# Patient Record
Sex: FEMALE | Race: WHITE | NOT HISPANIC OR LATINO | Employment: OTHER | URBAN - METROPOLITAN AREA
[De-identification: names, ages, dates, MRNs, and addresses within clinical notes are randomized per-mention and may not be internally consistent; named-entity substitution may affect disease eponyms.]

---

## 2023-06-22 DIAGNOSIS — I10 PRIMARY HYPERTENSION: Primary | ICD-10-CM

## 2023-06-22 RX ORDER — LISINOPRIL 10 MG/1
TABLET ORAL
Qty: 90 TABLET | Refills: 0 | Status: SHIPPED | OUTPATIENT
Start: 2023-06-22 | End: 2023-07-05 | Stop reason: SDUPTHER

## 2023-07-05 ENCOUNTER — OFFICE VISIT (OUTPATIENT)
Dept: PRIMARY CARE | Facility: CLINIC | Age: 78
End: 2023-07-05
Payer: MEDICARE

## 2023-07-05 ENCOUNTER — LAB (OUTPATIENT)
Dept: LAB | Facility: LAB | Age: 78
End: 2023-07-05
Payer: MEDICARE

## 2023-07-05 VITALS
HEIGHT: 61 IN | HEART RATE: 85 BPM | SYSTOLIC BLOOD PRESSURE: 124 MMHG | BODY MASS INDEX: 25.68 KG/M2 | DIASTOLIC BLOOD PRESSURE: 78 MMHG | TEMPERATURE: 97.8 F | OXYGEN SATURATION: 97 % | WEIGHT: 136 LBS

## 2023-07-05 DIAGNOSIS — R42 VERTIGO: ICD-10-CM

## 2023-07-05 DIAGNOSIS — I10 PRIMARY HYPERTENSION: ICD-10-CM

## 2023-07-05 DIAGNOSIS — E55.9 VITAMIN D DEFICIENCY: Primary | ICD-10-CM

## 2023-07-05 DIAGNOSIS — E55.9 VITAMIN D DEFICIENCY: ICD-10-CM

## 2023-07-05 DIAGNOSIS — Z00.00 ROUTINE ADULT HEALTH MAINTENANCE: ICD-10-CM

## 2023-07-05 DIAGNOSIS — Z78.0 ASYMPTOMATIC MENOPAUSE: ICD-10-CM

## 2023-07-05 DIAGNOSIS — K21.00 GASTROESOPHAGEAL REFLUX DISEASE WITH ESOPHAGITIS WITHOUT HEMORRHAGE: ICD-10-CM

## 2023-07-05 DIAGNOSIS — Z00.00 MEDICARE ANNUAL WELLNESS VISIT, SUBSEQUENT: ICD-10-CM

## 2023-07-05 PROBLEM — R73.03 PREDIABETES: Status: ACTIVE | Noted: 2020-02-19

## 2023-07-05 PROBLEM — M79.7 FIBROMYALGIA: Status: ACTIVE | Noted: 2019-08-30

## 2023-07-05 PROBLEM — N18.9 CHRONIC KIDNEY DISEASE: Status: ACTIVE | Noted: 2020-02-19

## 2023-07-05 PROBLEM — R73.01 IMPAIRED FASTING GLUCOSE: Status: ACTIVE | Noted: 2019-08-30

## 2023-07-05 LAB
ALANINE AMINOTRANSFERASE (SGPT) (U/L) IN SER/PLAS: 7 U/L (ref 7–45)
ALBUMIN (G/DL) IN SER/PLAS: 4.4 G/DL (ref 3.4–5)
ALKALINE PHOSPHATASE (U/L) IN SER/PLAS: 44 U/L (ref 33–136)
ANION GAP IN SER/PLAS: 16 MMOL/L (ref 10–20)
ASPARTATE AMINOTRANSFERASE (SGOT) (U/L) IN SER/PLAS: 11 U/L (ref 9–39)
BASOPHILS (10*3/UL) IN BLOOD BY AUTOMATED COUNT: 0.05 X10E9/L (ref 0–0.1)
BASOPHILS/100 LEUKOCYTES IN BLOOD BY AUTOMATED COUNT: 0.7 % (ref 0–2)
BILIRUBIN TOTAL (MG/DL) IN SER/PLAS: 0.3 MG/DL (ref 0–1.2)
CALCIDIOL (25 OH VITAMIN D3) (NG/ML) IN SER/PLAS: 30 NG/ML
CALCIUM (MG/DL) IN SER/PLAS: 10.1 MG/DL (ref 8.6–10.3)
CARBON DIOXIDE, TOTAL (MMOL/L) IN SER/PLAS: 21 MMOL/L (ref 21–32)
CHLORIDE (MMOL/L) IN SER/PLAS: 103 MMOL/L (ref 98–107)
CHOLESTEROL (MG/DL) IN SER/PLAS: 222 MG/DL (ref 0–199)
CHOLESTEROL IN HDL (MG/DL) IN SER/PLAS: 44 MG/DL
CHOLESTEROL/HDL RATIO: 5
CREATININE (MG/DL) IN SER/PLAS: 1.13 MG/DL (ref 0.5–1.05)
EOSINOPHILS (10*3/UL) IN BLOOD BY AUTOMATED COUNT: 0.09 X10E9/L (ref 0–0.4)
EOSINOPHILS/100 LEUKOCYTES IN BLOOD BY AUTOMATED COUNT: 1.3 % (ref 0–6)
ERYTHROCYTE DISTRIBUTION WIDTH (RATIO) BY AUTOMATED COUNT: 12.7 % (ref 11.5–14.5)
ERYTHROCYTE MEAN CORPUSCULAR HEMOGLOBIN CONCENTRATION (G/DL) BY AUTOMATED: 31.2 G/DL (ref 32–36)
ERYTHROCYTE MEAN CORPUSCULAR VOLUME (FL) BY AUTOMATED COUNT: 92 FL (ref 80–100)
ERYTHROCYTES (10*6/UL) IN BLOOD BY AUTOMATED COUNT: 4.64 X10E12/L (ref 4–5.2)
GFR FEMALE: 50 ML/MIN/1.73M2
GLUCOSE (MG/DL) IN SER/PLAS: 83 MG/DL (ref 74–99)
HEMATOCRIT (%) IN BLOOD BY AUTOMATED COUNT: 42.6 % (ref 36–46)
HEMOGLOBIN (G/DL) IN BLOOD: 13.3 G/DL (ref 12–16)
IMMATURE GRANULOCYTES/100 LEUKOCYTES IN BLOOD BY AUTOMATED COUNT: 0.3 % (ref 0–0.9)
LDL: 118 MG/DL (ref 0–99)
LEUKOCYTES (10*3/UL) IN BLOOD BY AUTOMATED COUNT: 7 X10E9/L (ref 4.4–11.3)
LYMPHOCYTES (10*3/UL) IN BLOOD BY AUTOMATED COUNT: 1.61 X10E9/L (ref 0.8–3)
LYMPHOCYTES/100 LEUKOCYTES IN BLOOD BY AUTOMATED COUNT: 23 % (ref 13–44)
MONOCYTES (10*3/UL) IN BLOOD BY AUTOMATED COUNT: 0.52 X10E9/L (ref 0.05–0.8)
MONOCYTES/100 LEUKOCYTES IN BLOOD BY AUTOMATED COUNT: 7.4 % (ref 2–10)
NEUTROPHILS (10*3/UL) IN BLOOD BY AUTOMATED COUNT: 4.71 X10E9/L (ref 1.6–5.5)
NEUTROPHILS/100 LEUKOCYTES IN BLOOD BY AUTOMATED COUNT: 67.3 % (ref 40–80)
NON HDL CHOLESTEROL: 178 MG/DL
PLATELETS (10*3/UL) IN BLOOD AUTOMATED COUNT: 439 X10E9/L (ref 150–450)
POTASSIUM (MMOL/L) IN SER/PLAS: 4.7 MMOL/L (ref 3.5–5.3)
PROTEIN TOTAL: 7.5 G/DL (ref 6.4–8.2)
SODIUM (MMOL/L) IN SER/PLAS: 135 MMOL/L (ref 136–145)
TRIGLYCERIDE (MG/DL) IN SER/PLAS: 301 MG/DL (ref 0–149)
UREA NITROGEN (MG/DL) IN SER/PLAS: 17 MG/DL (ref 6–23)
VLDL: 60 MG/DL (ref 0–40)

## 2023-07-05 PROCEDURE — 99214 OFFICE O/P EST MOD 30 MIN: CPT | Performed by: FAMILY MEDICINE

## 2023-07-05 PROCEDURE — 3078F DIAST BP <80 MM HG: CPT | Performed by: FAMILY MEDICINE

## 2023-07-05 PROCEDURE — 36415 COLL VENOUS BLD VENIPUNCTURE: CPT

## 2023-07-05 PROCEDURE — 82306 VITAMIN D 25 HYDROXY: CPT

## 2023-07-05 PROCEDURE — 1170F FXNL STATUS ASSESSED: CPT | Performed by: FAMILY MEDICINE

## 2023-07-05 PROCEDURE — G0439 PPPS, SUBSEQ VISIT: HCPCS | Performed by: FAMILY MEDICINE

## 2023-07-05 PROCEDURE — 85025 COMPLETE CBC W/AUTO DIFF WBC: CPT

## 2023-07-05 PROCEDURE — 80053 COMPREHEN METABOLIC PANEL: CPT

## 2023-07-05 PROCEDURE — 99397 PER PM REEVAL EST PAT 65+ YR: CPT | Performed by: FAMILY MEDICINE

## 2023-07-05 PROCEDURE — 1036F TOBACCO NON-USER: CPT | Performed by: FAMILY MEDICINE

## 2023-07-05 PROCEDURE — 80061 LIPID PANEL: CPT

## 2023-07-05 PROCEDURE — 1159F MED LIST DOCD IN RCRD: CPT | Performed by: FAMILY MEDICINE

## 2023-07-05 PROCEDURE — 3074F SYST BP LT 130 MM HG: CPT | Performed by: FAMILY MEDICINE

## 2023-07-05 RX ORDER — SODIUM CHLORIDE 1000 MG
0.5 TABLET, SOLUBLE MISCELLANEOUS DAILY
COMMUNITY
Start: 2022-12-05 | End: 2023-07-05 | Stop reason: SDUPTHER

## 2023-07-05 RX ORDER — MECLIZINE HYDROCHLORIDE 25 MG/1
25 TABLET ORAL 3 TIMES DAILY PRN
Qty: 30 TABLET | Refills: 2 | Status: SHIPPED | OUTPATIENT
Start: 2023-07-05 | End: 2024-03-06 | Stop reason: SDUPTHER

## 2023-07-05 RX ORDER — LISINOPRIL 10 MG/1
10 TABLET ORAL DAILY
Qty: 90 TABLET | Refills: 3 | Status: SHIPPED | OUTPATIENT
Start: 2023-07-05 | End: 2024-03-06 | Stop reason: SDUPTHER

## 2023-07-05 RX ORDER — VIT C/E/ZN/COPPR/LUTEIN/ZEAXAN 250MG-90MG
CAPSULE ORAL
COMMUNITY

## 2023-07-05 RX ORDER — MECLIZINE HYDROCHLORIDE 25 MG/1
TABLET ORAL
COMMUNITY
Start: 2021-06-24 | End: 2023-07-05 | Stop reason: SDUPTHER

## 2023-07-05 RX ORDER — FAMOTIDINE 40 MG/1
40 TABLET, FILM COATED ORAL 2 TIMES DAILY
Qty: 180 TABLET | Refills: 1 | Status: SHIPPED | OUTPATIENT
Start: 2023-07-05 | End: 2024-01-08

## 2023-07-05 RX ORDER — SODIUM CHLORIDE 1000 MG
0.5 TABLET, SOLUBLE MISCELLANEOUS DAILY
Qty: 45 TABLET | Refills: 3 | Status: SHIPPED | OUTPATIENT
Start: 2023-07-05 | End: 2023-12-20 | Stop reason: ALTCHOICE

## 2023-07-05 RX ORDER — LISINOPRIL 10 MG/1
10 TABLET ORAL DAILY
Qty: 90 TABLET | Refills: 1 | Status: SHIPPED | OUTPATIENT
Start: 2023-07-05 | End: 2023-07-05 | Stop reason: SDUPTHER

## 2023-07-05 ASSESSMENT — ACTIVITIES OF DAILY LIVING (ADL)
MANAGING_FINANCES: INDEPENDENT
DOING_HOUSEWORK: INDEPENDENT
GROCERY_SHOPPING: NEEDS ASSISTANCE
TAKING_MEDICATION: INDEPENDENT
BATHING: INDEPENDENT
DRESSING: INDEPENDENT

## 2023-07-05 ASSESSMENT — PATIENT HEALTH QUESTIONNAIRE - PHQ9
SUM OF ALL RESPONSES TO PHQ9 QUESTIONS 1 AND 2: 0
2. FEELING DOWN, DEPRESSED OR HOPELESS: NOT AT ALL
1. LITTLE INTEREST OR PLEASURE IN DOING THINGS: NOT AT ALL

## 2023-07-05 NOTE — PROGRESS NOTES
Subjective   Patient ID: Lynn Oneal is a 77 y.o. female who presents for Medicare Annual Wellness Visit Subsequent.  HPI  HPI: Annual Wellness visit. The patient has not felt down over the past 6 weeks. No limitation of movement. Reports no falls. The home has functioning smoke alarms, handrails on the stairs and rugs in the hallway. The home does not have grab bars in the bathroom or poor lighting. Denies hearing change in the ears bilaterally. No diet restrictions.     HTN: well controlled at home.  Having less light headed episodes.    Ventral hernia: occasionally protrudes.    Preparing meals - independent  Using telephone - independent  Bladder - continent  Bowel - continent    Recent hospital stays - no hospitalizations    ADLs - able to dress, walk and bath independently  Instrumental ADL's - able to shop, maintain finances, managing medications, housekeeping independently    Depression: PHQ-2 - negative    Opioid use -   none  Exercise -  mild  Diet - well balanced  Pain score - none    Providers  none    MiniCOG - 5/5    Education - educated pt on healthy eating, exercise, weight loss    Falls - no falls    Counseled pt on living will: encouraged advanced directive    CV screening: negative    Colonoscopy: last was negative    Diabetes screening: neg    Glaucoma screen: sees ophtho    CT chest tob screen:    Mammo: not eligible    DEXA: due next year    PAP/pelvis: NA    Vaccines:  fully vaccinated,     Patient Active Problem List   Diagnosis    Chronic kidney disease    Essential hypertension    Fibromyalgia    Impaired fasting glucose    Prediabetes       Social Connections: Not on file       Current Outpatient Medications on File Prior to Visit   Medication Sig Dispense Refill    cholecalciferol (Vitamin D-3) 25 MCG (1000 UT) capsule Take by mouth.      [DISCONTINUED] lisinopril 10 mg tablet TAKE 1 TABLET BY MOUTH EVERY DAY 90 tablet 0    [DISCONTINUED] sodium chloride 1,000 mg tablet Take 0.5 tablets  (0.5 g) by mouth once daily.      [DISCONTINUED] meclizine (Antivert) 25 mg tablet Take by mouth.       No current facility-administered medications on file prior to visit.        Vitals:    07/05/23 1211   BP: 124/78   Pulse: 85   Temp: 36.6 °C (97.8 °F)   SpO2: 97%     Vitals:    07/05/23 1211   Weight: 61.7 kg (136 lb)       Review of Systems   All other systems reviewed and are negative.      Objective     Physical Exam  Vitals reviewed.   Constitutional:       General: She is not in acute distress.     Appearance: Normal appearance. She is well-developed. She is not diaphoretic.   HENT:      Head: Normocephalic and atraumatic.      Right Ear: Tympanic membrane normal.      Left Ear: Tympanic membrane normal.      Nose: Nose normal.      Mouth/Throat:      Mouth: Mucous membranes are moist.   Eyes:      Pupils: Pupils are equal, round, and reactive to light.   Cardiovascular:      Rate and Rhythm: Normal rate and regular rhythm.      Heart sounds: Normal heart sounds. No murmur heard.     No friction rub. No gallop.   Pulmonary:      Effort: Pulmonary effort is normal.      Breath sounds: Normal breath sounds. No rales.   Abdominal:      General: Bowel sounds are normal.      Palpations: Abdomen is soft. There is mass.      Tenderness: There is no abdominal tenderness.   Musculoskeletal:      Cervical back: Normal range of motion and neck supple.   Skin:     General: Skin is warm and dry.   Neurological:      Mental Status: She is alert.   Psychiatric:         Mood and Affect: Mood normal.         No visits with results within 2 Month(s) from this visit.   Latest known visit with results is:   Legacy Encounter on 10/25/2022   Component Date Value Ref Range Status    Glucose 10/25/2022 136 (H)  74 - 99 mg/dL Final    Sodium 10/25/2022 136  136 - 145 mmol/L Final    Potassium 10/25/2022 4.4  3.5 - 5.3 mmol/L Final    Chloride 10/25/2022 99  98 - 107 mmol/L Final    Bicarbonate 10/25/2022 23  21 - 32 mmol/L Final     Anion Gap 10/25/2022 18  10 - 20 mmol/L Final    Urea Nitrogen 10/25/2022 12  6 - 23 mg/dL Final    Creatinine 10/25/2022 1.17 (H)  0.50 - 1.05 mg/dL Final    GFR Female 10/25/2022 48 (A)  >90 mL/min/1.73m2 Final    Comment:  CALCULATIONS OF ESTIMATED GFR ARE PERFORMED   USING THE 2021 CKD-EPI STUDY REFIT EQUATION   WITHOUT THE RACE VARIABLE FOR THE IDMS-TRACEABLE   CREATININE METHODS.    https://jasn.asnjournals.org/content/early/2021/09/22/ASN.9505850994      Calcium 10/25/2022 9.6  8.6 - 10.3 mg/dL Final       Assessment/Plan   Problem List Items Addressed This Visit    None  Visit Diagnoses       Vitamin D deficiency    -  Primary    Relevant Orders    Vitamin D, Total    Primary hypertension        Relevant Medications    sodium chloride 1,000 mg tablet    lisinopril 10 mg tablet    Other Relevant Orders    Comprehensive Metabolic Panel    Lipid Panel    CBC and Auto Differential    Vertigo        Relevant Medications    meclizine (Antivert) 25 mg tablet    Medicare annual wellness visit, subsequent        Routine adult health maintenance            .    Doing well.  Refilled pts meds.  Fu in 12 mo.  Suspect pt has ventral hernia.  OK for watchful waiting.

## 2023-07-06 ENCOUNTER — TELEPHONE (OUTPATIENT)
Dept: PRIMARY CARE | Facility: CLINIC | Age: 78
End: 2023-07-06
Payer: MEDICARE

## 2023-07-06 NOTE — TELEPHONE ENCOUNTER
----- Message from Kevin Dawn MD sent at 7/5/2023 10:09 PM EDT -----  Patient's blood work is similar to what she had last year.  Overall looks good

## 2023-09-28 ENCOUNTER — TELEPHONE (OUTPATIENT)
Dept: PRIMARY CARE | Facility: CLINIC | Age: 78
End: 2023-09-28
Payer: MEDICARE

## 2023-09-28 DIAGNOSIS — I10 PRIMARY HYPERTENSION: ICD-10-CM

## 2023-09-28 RX ORDER — LISINOPRIL 10 MG/1
10 TABLET ORAL DAILY
Qty: 90 TABLET | Refills: 3 | Status: CANCELLED | OUTPATIENT
Start: 2023-09-28

## 2023-09-28 NOTE — TELEPHONE ENCOUNTER
Pt called Rx line asking for a refill on Lisinopril, med was filled on 7/5/23 for 90 day and 3 refills. Need to tell pt she has plenty at pharmacy. Thanks, AM

## 2023-10-11 ENCOUNTER — TELEPHONE (OUTPATIENT)
Dept: PRIMARY CARE | Facility: CLINIC | Age: 78
End: 2023-10-11
Payer: MEDICARE

## 2023-10-11 DIAGNOSIS — M79.7 FIBROMYALGIA: ICD-10-CM

## 2023-10-11 DIAGNOSIS — Z12.31 ENCOUNTER FOR SCREENING MAMMOGRAM FOR MALIGNANT NEOPLASM OF BREAST: Primary | ICD-10-CM

## 2023-10-11 RX ORDER — TRAMADOL HYDROCHLORIDE 50 MG/1
50 TABLET ORAL EVERY 8 HOURS PRN
Qty: 21 TABLET | Refills: 0 | Status: SHIPPED | OUTPATIENT
Start: 2023-10-11 | End: 2023-10-18

## 2023-10-11 NOTE — TELEPHONE ENCOUNTER
Patient states she thought about the medicine Dr. Dawn wanted to send in for her it is Tramadol [facility] [place] she wants to try it.    Also needs an order for Mammo she had breast cancer and has not had one done for a while.

## 2023-10-30 ENCOUNTER — TELEPHONE (OUTPATIENT)
Dept: PRIMARY CARE | Facility: CLINIC | Age: 78
End: 2023-10-30
Payer: MEDICARE

## 2023-10-30 DIAGNOSIS — M79.7 FIBROMYALGIA: Primary | ICD-10-CM

## 2023-10-30 DIAGNOSIS — M19.91 PRIMARY OSTEOARTHRITIS, UNSPECIFIED SITE: ICD-10-CM

## 2023-10-30 NOTE — TELEPHONE ENCOUNTER
Pt called rx line @ 257 req rf on Tramadol    Pt last seen on &-5-23 for Medicare wellnes, I dont see this med on her list in EPIC or [employer], please advise/ mk

## 2023-10-31 RX ORDER — TRAMADOL HYDROCHLORIDE 50 MG/1
50 TABLET ORAL EVERY 8 HOURS PRN
Qty: 90 TABLET | Refills: 1 | Status: SHIPPED | OUTPATIENT
Start: 2023-10-31 | End: 2024-02-26

## 2023-11-03 ENCOUNTER — HOSPITAL ENCOUNTER (OUTPATIENT)
Dept: RADIOLOGY | Facility: HOSPITAL | Age: 78
End: 2023-11-03
Payer: MEDICARE

## 2023-11-03 ENCOUNTER — HOSPITAL ENCOUNTER (OUTPATIENT)
Dept: RADIOLOGY | Facility: HOSPITAL | Age: 78
Discharge: HOME | End: 2023-11-03
Payer: MEDICARE

## 2023-11-03 DIAGNOSIS — Z12.31 ENCOUNTER FOR SCREENING MAMMOGRAM FOR MALIGNANT NEOPLASM OF BREAST: ICD-10-CM

## 2023-11-03 PROCEDURE — 77063 BREAST TOMOSYNTHESIS BI: CPT

## 2023-11-03 PROCEDURE — 77067 SCR MAMMO BI INCL CAD: CPT

## 2023-11-07 ENCOUNTER — TELEPHONE (OUTPATIENT)
Dept: PRIMARY CARE | Facility: CLINIC | Age: 78
End: 2023-11-07
Payer: MEDICARE

## 2023-11-07 DIAGNOSIS — Z12.31 ENCOUNTER FOR SCREENING MAMMOGRAM FOR MALIGNANT NEOPLASM OF BREAST: Primary | ICD-10-CM

## 2023-11-07 NOTE — PROGRESS NOTES
Subjective   Patient ID: Lynn Oneal is a 78 y.o. female who presents for No chief complaint on file..  HPI    Patient Active Problem List   Diagnosis    Chronic kidney disease    Essential hypertension    Fibromyalgia    Impaired fasting glucose    Prediabetes       Social Connections: Not on file       Current Outpatient Medications on File Prior to Visit   Medication Sig Dispense Refill    cholecalciferol (Vitamin D-3) 25 MCG (1000 UT) capsule Take by mouth.      famotidine (Pepcid) 40 mg tablet Take 1 tablet (40 mg) by mouth 2 times a day. 180 tablet 1    lisinopril 10 mg tablet Take 1 tablet (10 mg) by mouth once daily. 90 tablet 3    meclizine (Antivert) 25 mg tablet Take 1 tablet (25 mg) by mouth 3 times a day as needed for dizziness. 30 tablet 2    sodium chloride 1,000 mg tablet Take 0.5 tablets (0.5 g) by mouth once daily. 45 tablet 3    traMADol (Ultram) 50 mg tablet Take 1 tablet (50 mg) by mouth every 8 hours if needed for severe pain (7 - 10). 90 tablet 1     No current facility-administered medications on file prior to visit.        There were no vitals filed for this visit.  There were no vitals filed for this visit.    Review of Systems    Objective     Physical Exam    No visits with results within 2 Month(s) from this visit.   Latest known visit with results is:   Lab on 07/05/2023   Component Date Value Ref Range Status    Glucose 07/05/2023 83  74 - 99 mg/dL Final    Sodium 07/05/2023 135 (L)  136 - 145 mmol/L Final    Potassium 07/05/2023 4.7  3.5 - 5.3 mmol/L Final    Chloride 07/05/2023 103  98 - 107 mmol/L Final    Bicarbonate 07/05/2023 21  21 - 32 mmol/L Final    Anion Gap 07/05/2023 16  10 - 20 mmol/L Final    Urea Nitrogen 07/05/2023 17  6 - 23 mg/dL Final    Creatinine 07/05/2023 1.13 (H)  0.50 - 1.05 mg/dL Final    GFR Female 07/05/2023 50 (A)  >90 mL/min/1.73m2 Final     CALCULATIONS OF ESTIMATED GFR ARE PERFORMED   USING THE 2021 CKD-EPI STUDY REFIT EQUATION   WITHOUT THE RACE  VARIABLE FOR THE IDMS-TRACEABLE   CREATININE METHODS.    https://jasn.asnjournals.org/content/early/2021/09/22/ASN.2744523281    Calcium 07/05/2023 10.1  8.6 - 10.3 mg/dL Final    Albumin 07/05/2023 4.4  3.4 - 5.0 g/dL Final    Alkaline Phosphatase 07/05/2023 44  33 - 136 U/L Final    Total Protein 07/05/2023 7.5  6.4 - 8.2 g/dL Final    AST 07/05/2023 11  9 - 39 U/L Final    Total Bilirubin 07/05/2023 0.3  0.0 - 1.2 mg/dL Final    ALT (SGPT) 07/05/2023 7  7 - 45 U/L Final     Patients treated with Sulfasalazine may generate    falsely decreased results for ALT.    Cholesterol 07/05/2023 222 (H)  0 - 199 mg/dL Final    .      AGE      DESIRABLE   BORDERLINE HIGH   HIGH     0-19 Y     0 - 169       170 - 199     >/= 200    20-24 Y     0 - 189       190 - 224     >/= 225         >24 Y     0 - 199       200 - 239     >/= 240   **All ranges are based on fasting samples. Specific   therapeutic targets will vary based on patient-specific   cardiac risk.  .   Pediatric guidelines reference:Pediatrics 2011, 128(S5).   Adult guidelines reference: NCEP ATPIII Guidelines,     ROB 2001, 258:2486-97  .   Venipuncture immediately after or during the    administration of Metamizole may lead to falsely   low results. Testing should be performed immediately   prior to Metamizole dosing.    HDL 07/05/2023 44.0  mg/dL Final    .      AGE      VERY LOW   LOW     NORMAL    HIGH       0-19 Y       < 35   < 40     40-45     ----    20-24 Y       ----   < 40       >45     ----      >24 Y       ----   < 40     40-60      >60  .    Cholesterol/HDL Ratio 07/05/2023 5.0   Final    REF VALUES  DESIRABLE  < 3.4  HIGH RISK  > 5.0    LDL 07/05/2023 118 (H)  0 - 99 mg/dL Final    .                           NEAR      BORD      AGE      DESIRABLE  OPTIMAL    HIGH     HIGH     VERY HIGH     0-19 Y     0 - 109     ---    110-129   >/= 130     ----    20-24 Y     0 - 119     ---    120-159   >/= 160     ----      >24 Y     0 -  99   100-129  130-159    160-189     >/=190  .    VLDL 07/05/2023 60 (H)  0 - 40 mg/dL Final    Triglycerides 07/05/2023 301 (H)  0 - 149 mg/dL Final    .      AGE      DESIRABLE   BORDERLINE HIGH   HIGH     VERY HIGH   0 D-90 D    19 - 174         ----         ----        ----  91 D- 9 Y     0 -  74        75 -  99     >/= 100      ----    10-19 Y     0 -  89        90 - 129     >/= 130      ----    20-24 Y     0 - 114       115 - 149     >/= 150      ----         >24 Y     0 - 149       150 - 199    200- 499    >/= 500  .   Venipuncture immediately after or during the    administration of Metamizole may lead to falsely   low results. Testing should be performed immediately   prior to Metamizole dosing.    Non HDL Cholesterol 07/05/2023 178  mg/dL Final        AGE      DESIRABLE   BORDERLINE HIGH   HIGH     VERY HIGH     0-19 Y     0 - 119       120 - 144     >/= 145    >/= 160    20-24 Y     0 - 149       150 - 189     >/= 190      ----         >24 Y    30 MG/DL ABOVE LDL CHOLESTEROL GOAL  .    Vitamin D, 25-Hydroxy 07/05/2023 30  ng/mL Final    .  DEFICIENCY:         < 20   NG/ML  INSUFFICIENCY:      20-29  NG/ML  SUFFICIENCY:         NG/ML    THIS ASSAY ACCURATELY QUANTIFIES THE SUM OF  VITAMIN D3, 25-HYDROXY AND VIT D2,25-HYDROXY.    WBC 07/05/2023 7.0  4.4 - 11.3 x10E9/L Final    RBC 07/05/2023 4.64  4.00 - 5.20 x10E12/L Final    Hemoglobin 07/05/2023 13.3  12.0 - 16.0 g/dL Final    Hematocrit 07/05/2023 42.6  36.0 - 46.0 % Final    MCV 07/05/2023 92  80 - 100 fL Final    MCHC 07/05/2023 31.2 (L)  32.0 - 36.0 g/dL Final    Platelets 07/05/2023 439  150 - 450 x10E9/L Final    RDW 07/05/2023 12.7  11.5 - 14.5 % Final    Neutrophils % 07/05/2023 67.3  40.0 - 80.0 % Final    Immature Granulocytes %, Automated 07/05/2023 0.3  0.0 - 0.9 % Final     Immature Granulocyte Count (IG) includes promyelocytes,    myelocytes and metamyelocytes but does not include bands.   Percent differential counts (%) should be interpreted in the   context  of the absolute cell counts (cells/L).    Lymphocytes % 07/05/2023 23.0  13.0 - 44.0 % Final    Monocytes % 07/05/2023 7.4  2.0 - 10.0 % Final    Eosinophils % 07/05/2023 1.3  0.0 - 6.0 % Final    Basophils % 07/05/2023 0.7  0.0 - 2.0 % Final    Neutrophils Absolute 07/05/2023 4.71  1.60 - 5.50 x10E9/L Final    Lymphocytes Absolute 07/05/2023 1.61  0.80 - 3.00 x10E9/L Final    Monocytes Absolute 07/05/2023 0.52  0.05 - 0.80 x10E9/L Final    Eosinophils Absolute 07/05/2023 0.09  0.00 - 0.40 x10E9/L Final    Basophils Absolute 07/05/2023 0.05  0.00 - 0.10 x10E9/L Final       Assessment/Plan

## 2023-11-17 ENCOUNTER — APPOINTMENT (OUTPATIENT)
Dept: RADIOLOGY | Facility: CLINIC | Age: 78
End: 2023-11-17
Payer: MEDICARE

## 2023-11-20 ENCOUNTER — TELEPHONE (OUTPATIENT)
Dept: PRIMARY CARE | Facility: CLINIC | Age: 78
End: 2023-11-20
Payer: MEDICARE

## 2023-11-20 DIAGNOSIS — R92.8 ABNORMAL MAMMOGRAM: Primary | ICD-10-CM

## 2023-11-20 NOTE — RESULT ENCOUNTER NOTE
Please let pt know there was a section on her mammogram that the radiologist needed better pictures of- I've ordered a R breast mammo

## 2023-11-20 NOTE — TELEPHONE ENCOUNTER
----- Message from Kevin Dawn MD sent at 11/20/2023  6:10 AM EST -----  Please let pt know there was a section on her mammogram that the radiologist needed better pictures of- I've ordered a R breast mammo

## 2023-12-05 ENCOUNTER — HOSPITAL ENCOUNTER (OUTPATIENT)
Dept: RADIOLOGY | Facility: HOSPITAL | Age: 78
Discharge: HOME | End: 2023-12-05
Payer: MEDICARE

## 2023-12-05 DIAGNOSIS — R92.8 ABNORMAL MAMMOGRAM: ICD-10-CM

## 2023-12-05 DIAGNOSIS — R92.1 CALCIFICATION OF RIGHT BREAST ON MAMMOGRAPHY: Primary | ICD-10-CM

## 2023-12-05 DIAGNOSIS — R92.8 ABNORMAL FINDINGS ON DIAGNOSTIC IMAGING OF BREAST: ICD-10-CM

## 2023-12-05 PROCEDURE — 77065 DX MAMMO INCL CAD UNI: CPT | Mod: RIGHT SIDE | Performed by: RADIOLOGY

## 2023-12-05 PROCEDURE — 76642 ULTRASOUND BREAST LIMITED: CPT | Mod: RT

## 2023-12-05 PROCEDURE — G0279 TOMOSYNTHESIS, MAMMO: HCPCS | Mod: RIGHT SIDE | Performed by: RADIOLOGY

## 2023-12-05 PROCEDURE — 76642 ULTRASOUND BREAST LIMITED: CPT | Mod: RIGHT SIDE | Performed by: RADIOLOGY

## 2023-12-05 PROCEDURE — 77065 DX MAMMO INCL CAD UNI: CPT | Mod: RT

## 2023-12-05 NOTE — NURSING NOTE
Patient friend Hailey included in results and education review at patient request. After patient review of diagnostic results with Dr. Arriola, support provided. Written literature regarding abnormal breast imaging and breast biopsy including what to expect before, during, and after the procedure reviewed with the patient. All questions answered. Patient verbalized she does not drive and preference is to schedule follow up when friend Hailey is available to take her to visits. Able to accommodate patient needs during scheduling. Patient selected Dr. Melara for surgical consultation 12/12 1600 with biopsy to follow 12/13 1300. Information provided and reviewed to include provider information and how to reach me directly with questions or concerns before concluding visit.

## 2023-12-05 NOTE — PROGRESS NOTES
Patient follow up scheduling:  right breast stereotactic biopsy per provider recommendation, 12/13 1300 per patient scheduling preference. Order pending Dr. Melara signature.

## 2023-12-05 NOTE — Clinical Note
Your patient Lynn seen for diagnostic breast imaging today has results available for your review. I assisted with follow up scheduling and education review today. She will see Dr. Melara for consultation 12/12, with biopsy to follow 12/13 per her scheduling preferences. Nothing further needed at this time. Thanks.

## 2023-12-06 ENCOUNTER — TELEPHONE (OUTPATIENT)
Dept: PRIMARY CARE | Facility: CLINIC | Age: 78
End: 2023-12-06
Payer: MEDICARE

## 2023-12-06 DIAGNOSIS — D48.61 NEOPLASM OF UNCERTAIN BEHAVIOR OF BREAST, RIGHT: Primary | ICD-10-CM

## 2023-12-06 NOTE — TELEPHONE ENCOUNTER
----- Message from Kevin Dawn MD sent at 12/6/2023 11:02 AM EST -----  Pts mammogram looks like there is something worth getting a biopsy of.  I'm going to refer her to a surgeon to get a biopsy.

## 2023-12-06 NOTE — RESULT ENCOUNTER NOTE
Pts mammogram looks like there is something worth getting a biopsy of.  I'm going to refer her to a surgeon to get a biopsy.

## 2023-12-13 ENCOUNTER — HOSPITAL ENCOUNTER (OUTPATIENT)
Dept: RADIOLOGY | Facility: HOSPITAL | Age: 78
Discharge: HOME | End: 2023-12-13
Payer: MEDICARE

## 2023-12-13 DIAGNOSIS — R92.1 CALCIFICATION OF RIGHT BREAST ON MAMMOGRAPHY: ICD-10-CM

## 2023-12-13 DIAGNOSIS — R92.8 ABNORMAL FINDINGS ON DIAGNOSTIC IMAGING OF BREAST: ICD-10-CM

## 2023-12-13 PROCEDURE — 88341 IMHCHEM/IMCYTCHM EA ADD ANTB: CPT | Performed by: PATHOLOGY

## 2023-12-13 PROCEDURE — 88342 IMHCHEM/IMCYTCHM 1ST ANTB: CPT | Performed by: PATHOLOGY

## 2023-12-13 PROCEDURE — 19081 BX BREAST 1ST LESION STRTCTC: CPT | Mod: RIGHT SIDE | Performed by: RADIOLOGY

## 2023-12-13 PROCEDURE — 77065 DX MAMMO INCL CAD UNI: CPT | Mod: RIGHT SIDE | Performed by: RADIOLOGY

## 2023-12-13 PROCEDURE — 88305 TISSUE EXAM BY PATHOLOGIST: CPT | Performed by: PATHOLOGY

## 2023-12-13 PROCEDURE — 77065 DX MAMMO INCL CAD UNI: CPT | Mod: RT

## 2023-12-13 PROCEDURE — 88305 TISSUE EXAM BY PATHOLOGIST: CPT | Mod: TC,SUR,PORLAB | Performed by: SURGERY

## 2023-12-13 PROCEDURE — 19081 BX BREAST 1ST LESION STRTCTC: CPT | Mod: RT

## 2023-12-13 PROCEDURE — 88360 TUMOR IMMUNOHISTOCHEM/MANUAL: CPT | Performed by: PATHOLOGY

## 2023-12-13 PROCEDURE — 2720000007 HC OR 272 NO HCPCS

## 2023-12-20 ENCOUNTER — OFFICE VISIT (OUTPATIENT)
Dept: PRIMARY CARE | Facility: CLINIC | Age: 78
End: 2023-12-20
Payer: MEDICARE

## 2023-12-20 VITALS
TEMPERATURE: 98 F | DIASTOLIC BLOOD PRESSURE: 78 MMHG | BODY MASS INDEX: 25.55 KG/M2 | WEIGHT: 133 LBS | SYSTOLIC BLOOD PRESSURE: 112 MMHG | HEART RATE: 90 BPM | OXYGEN SATURATION: 98 %

## 2023-12-20 DIAGNOSIS — I10 PRIMARY HYPERTENSION: ICD-10-CM

## 2023-12-20 DIAGNOSIS — M79.7 FIBROMYALGIA: ICD-10-CM

## 2023-12-20 DIAGNOSIS — M19.91 PRIMARY OSTEOARTHRITIS, UNSPECIFIED SITE: ICD-10-CM

## 2023-12-20 DIAGNOSIS — D05.11 DUCTAL CARCINOMA IN SITU (DCIS) OF RIGHT BREAST: Primary | ICD-10-CM

## 2023-12-20 DIAGNOSIS — R42 VERTIGO: ICD-10-CM

## 2023-12-20 LAB
LAB AP ASR DISCLAIMER: NORMAL
LABORATORY COMMENT REPORT: NORMAL
PATH REPORT.ADDENDUM SPEC: NORMAL
PATH REPORT.FINAL DX SPEC: NORMAL
PATH REPORT.GROSS SPEC: NORMAL
PATH REPORT.TOTAL CANCER: NORMAL

## 2023-12-20 PROCEDURE — 3074F SYST BP LT 130 MM HG: CPT | Performed by: FAMILY MEDICINE

## 2023-12-20 PROCEDURE — 99214 OFFICE O/P EST MOD 30 MIN: CPT | Performed by: FAMILY MEDICINE

## 2023-12-20 PROCEDURE — 1036F TOBACCO NON-USER: CPT | Performed by: FAMILY MEDICINE

## 2023-12-20 PROCEDURE — 3078F DIAST BP <80 MM HG: CPT | Performed by: FAMILY MEDICINE

## 2023-12-20 PROCEDURE — 1159F MED LIST DOCD IN RCRD: CPT | Performed by: FAMILY MEDICINE

## 2023-12-20 ASSESSMENT — ENCOUNTER SYMPTOMS: HYPERTENSION: 1

## 2023-12-20 NOTE — PROGRESS NOTES
cSubjective   Patient ID: Lynn Oneal is a 78 y.o. female who presents for Hypertension (Recheck ).  Hypertension      HTN: well controlled  Fibromyalgia: helped by tramadol which she takes rarely.  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  Still has to find out next steps for treatment.  GERD: stable  Vertigo: taking meclizine prn    Patient Active Problem List   Diagnosis    Chronic kidney disease    Essential hypertension    Fibromyalgia    Impaired fasting glucose    Prediabetes       Social Connections: Not on file       Current Outpatient Medications on File Prior to Visit   Medication Sig Dispense Refill    cholecalciferol (Vitamin D-3) 25 MCG (1000 UT) capsule Take by mouth.      famotidine (Pepcid) 40 mg tablet Take 1 tablet (40 mg) by mouth 2 times a day. 180 tablet 1    lisinopril 10 mg tablet Take 1 tablet (10 mg) by mouth once daily. 90 tablet 3    meclizine (Antivert) 25 mg tablet Take 1 tablet (25 mg) by mouth 3 times a day as needed for dizziness. 30 tablet 2    traMADol (Ultram) 50 mg tablet Take 1 tablet (50 mg) by mouth every 8 hours if needed for severe pain (7 - 10). 90 tablet 1    [DISCONTINUED] sodium chloride 1,000 mg tablet Take 0.5 tablets (0.5 g) by mouth once daily. 45 tablet 3     No current facility-administered medications on file prior to visit.        Vitals:    12/20/23 1500   BP: 112/78   Pulse: 90   Temp: 36.7 °C (98 °F)   SpO2: 98%     Vitals:    12/20/23 1500   Weight: 60.3 kg (133 lb)       Review of Systems   All other systems reviewed and are negative.      Objective     Physical Exam  Constitutional:       Appearance: Normal appearance. She is well-developed.   HENT:      Head: Atraumatic.   Cardiovascular:      Rate and Rhythm: Normal rate and regular rhythm.      Heart sounds: Normal heart sounds. No murmur heard.  Pulmonary:      Effort: Pulmonary effort is normal.      Breath sounds: Normal breath sounds.   Abdominal:      General: Bowel sounds are normal.       Palpations: Abdomen is soft.   Skin:     General: Skin is warm.   Neurological:      General: No focal deficit present.      Mental Status: She is alert.   Psychiatric:         Mood and Affect: Mood normal.         Hospital Outpatient Visit on 12/13/2023   Component Date Value Ref Range Status    Case Report 12/13/2023    Final                    Value:Surgical Pathology                                Case: [id number]                                  Authorizing Provider:  Yasmine Melara MD        Collected:           12/13/2023 1348              Ordering Location:     [facility]  Received:            12/13/2023 1413              Pathologist:           Guadalupe Palmer MD                                                           Specimen:    BREAST CORE BIOPSY RIGHT, right breast tissue   core biopsy                                FINAL DIAGNOSIS 12/13/2023    Final                    Value:This result contains rich text formatting which cannot be displayed here.      12/13/2023    Final                    Value:This result contains rich text formatting which cannot be displayed here.    Addendum 12/13/2023    Final                    Value:This result contains rich text formatting which cannot be displayed here.    Gross Description 12/13/2023    Final                    Value:This result contains rich text formatting which cannot be displayed here.    Disclaimer 12/13/2023    Final                    Value:This result contains rich text formatting which cannot be displayed here.       Assessment/Plan   Problem List Items Addressed This Visit             ICD-10-CM    Fibromyalgia M79.7     Other Visit Diagnoses         Codes    Ductal carcinoma in situ (DCIS) of right breast    -  Primary D05.11    Relevant Orders    Referral to Hematology and Oncology    Primary hypertension     I10    Relevant Orders    Comprehensive Metabolic Panel    CBC and Auto Differential    Vertigo     R42    Primary  osteoarthritis, unspecified site     M19.91          Ref to oncology.  Pt to follow up w/ Dr. Melara tomorrow.  Refilled other meds.   Fu in 6 mo

## 2023-12-22 ENCOUNTER — LAB (OUTPATIENT)
Dept: LAB | Facility: LAB | Age: 78
End: 2023-12-22
Payer: MEDICARE

## 2023-12-22 DIAGNOSIS — I10 PRIMARY HYPERTENSION: ICD-10-CM

## 2023-12-22 LAB
ALBUMIN SERPL BCP-MCNC: 4.2 G/DL (ref 3.4–5)
ALP SERPL-CCNC: 48 U/L (ref 33–136)
ALT SERPL W P-5'-P-CCNC: 7 U/L (ref 7–45)
ANION GAP SERPL CALC-SCNC: 12 MMOL/L (ref 10–20)
AST SERPL W P-5'-P-CCNC: 11 U/L (ref 9–39)
BASOPHILS # BLD AUTO: 0.05 X10*3/UL (ref 0–0.1)
BASOPHILS NFR BLD AUTO: 0.6 %
BILIRUB SERPL-MCNC: 0.3 MG/DL (ref 0–1.2)
BUN SERPL-MCNC: 19 MG/DL (ref 6–23)
CALCIUM SERPL-MCNC: 10 MG/DL (ref 8.6–10.3)
CHLORIDE SERPL-SCNC: 103 MMOL/L (ref 98–107)
CO2 SERPL-SCNC: 28 MMOL/L (ref 21–32)
CREAT SERPL-MCNC: 1.34 MG/DL (ref 0.5–1.05)
EOSINOPHIL # BLD AUTO: 0.09 X10*3/UL (ref 0–0.4)
EOSINOPHIL NFR BLD AUTO: 1 %
ERYTHROCYTE [DISTWIDTH] IN BLOOD BY AUTOMATED COUNT: 12.2 % (ref 11.5–14.5)
GFR SERPL CREATININE-BSD FRML MDRD: 40 ML/MIN/1.73M*2
GLUCOSE SERPL-MCNC: 114 MG/DL (ref 74–99)
HCT VFR BLD AUTO: 43.1 % (ref 36–46)
HGB BLD-MCNC: 13.4 G/DL (ref 12–16)
IMM GRANULOCYTES # BLD AUTO: 0.03 X10*3/UL (ref 0–0.5)
IMM GRANULOCYTES NFR BLD AUTO: 0.3 % (ref 0–0.9)
LYMPHOCYTES # BLD AUTO: 1.75 X10*3/UL (ref 0.8–3)
LYMPHOCYTES NFR BLD AUTO: 20.2 %
MCH RBC QN AUTO: 28.9 PG (ref 26–34)
MCHC RBC AUTO-ENTMCNC: 31.1 G/DL (ref 32–36)
MCV RBC AUTO: 93 FL (ref 80–100)
MONOCYTES # BLD AUTO: 0.51 X10*3/UL (ref 0.05–0.8)
MONOCYTES NFR BLD AUTO: 5.9 %
NEUTROPHILS # BLD AUTO: 6.23 X10*3/UL (ref 1.6–5.5)
NEUTROPHILS NFR BLD AUTO: 72 %
NRBC BLD-RTO: 0 /100 WBCS (ref 0–0)
PLATELET # BLD AUTO: 348 X10*3/UL (ref 150–450)
POTASSIUM SERPL-SCNC: 4.8 MMOL/L (ref 3.5–5.3)
PROT SERPL-MCNC: 6.6 G/DL (ref 6.4–8.2)
RBC # BLD AUTO: 4.63 X10*6/UL (ref 4–5.2)
SODIUM SERPL-SCNC: 138 MMOL/L (ref 136–145)
WBC # BLD AUTO: 8.7 X10*3/UL (ref 4.4–11.3)

## 2023-12-22 PROCEDURE — 36415 COLL VENOUS BLD VENIPUNCTURE: CPT

## 2023-12-22 PROCEDURE — 80053 COMPREHEN METABOLIC PANEL: CPT

## 2023-12-22 PROCEDURE — 85025 COMPLETE CBC W/AUTO DIFF WBC: CPT

## 2023-12-26 ENCOUNTER — TELEPHONE (OUTPATIENT)
Dept: PRIMARY CARE | Facility: CLINIC | Age: 78
End: 2023-12-26
Payer: MEDICARE

## 2023-12-26 NOTE — RESULT ENCOUNTER NOTE
Pts BW showed a small bump in her kidney function.  Try to stay well hydrated.  Otherwise looks ok.

## 2023-12-26 NOTE — TELEPHONE ENCOUNTER
----- Message from Kevin Dawn MD sent at 12/25/2023  9:34 PM EST -----  Pts BW showed a small bump in her kidney function.  Try to stay well hydrated.  Otherwise looks ok.

## 2024-01-05 DIAGNOSIS — K21.00 GASTROESOPHAGEAL REFLUX DISEASE WITH ESOPHAGITIS WITHOUT HEMORRHAGE: ICD-10-CM

## 2024-01-08 RX ORDER — FAMOTIDINE 40 MG/1
40 TABLET, FILM COATED ORAL 2 TIMES DAILY
Qty: 180 TABLET | Refills: 1 | Status: SHIPPED | OUTPATIENT
Start: 2024-01-08 | End: 2024-03-06 | Stop reason: SDUPTHER

## 2024-01-12 ENCOUNTER — TELEPHONE (OUTPATIENT)
Dept: RADIOLOGY | Facility: HOSPITAL | Age: 79
End: 2024-01-12
Payer: MEDICARE

## 2024-01-12 DIAGNOSIS — D05.11 DUCTAL CARCINOMA IN SITU (DCIS) OF RIGHT BREAST: Primary | ICD-10-CM

## 2024-01-12 NOTE — TELEPHONE ENCOUNTER
Outreach in response to scheduled surgical intervention. Plan of care for magseed placement prior to surgery reviewed with patient. What to expect before, during, and after procedure reviewed with patient. All questions answered. Patient accepted apt 1/22 0900 with correct read back after review. Patient reports she is doing well at this time with support from her good friend. Encouraged patient to reach out to this RN for questions/support/educational information as needed. Patient denies further needs at this time.

## 2024-01-12 NOTE — TELEPHONE ENCOUNTER
Outreach call placed to patient for follow up scheduling. Message left requesting call back to this RN Navigator.

## 2024-01-17 ENCOUNTER — TELEPHONE (OUTPATIENT)
Dept: RADIOLOGY | Facility: HOSPITAL | Age: 79
End: 2024-01-17
Payer: MEDICARE

## 2024-01-17 NOTE — TELEPHONE ENCOUNTER
Patient agreeable to change her visit in Mammography to 1/24 0900, correctly reading back information after review. All questions answered. Patient denies further needs at this time.

## 2024-01-23 ENCOUNTER — TELEMEDICINE CLINICAL SUPPORT (OUTPATIENT)
Dept: PREADMISSION TESTING | Facility: HOSPITAL | Age: 79
End: 2024-01-23
Payer: MEDICARE

## 2024-01-23 RX ORDER — ACETAMINOPHEN 325 MG/1
325 TABLET ORAL EVERY 6 HOURS PRN
COMMUNITY
End: 2024-01-31 | Stop reason: HOSPADM

## 2024-01-23 RX ORDER — TRAMADOL HYDROCHLORIDE 50 MG/1
50 TABLET ORAL EVERY 6 HOURS PRN
COMMUNITY
End: 2024-01-31 | Stop reason: HOSPADM

## 2024-01-23 NOTE — PREPROCEDURE INSTRUCTIONS
Medication List            Accurate as of January 23, 2024  9:58 AM. Always use your most recent med list.                acetaminophen 325 mg tablet  Commonly known as: Tylenol     cholecalciferol 25 MCG (1000 UT) capsule  Commonly known as: Vitamin D-3     famotidine 40 mg tablet  Commonly known as: Pepcid  TAKE 1 TABLET BY MOUTH 2 TIMES A DAY     lisinopril 10 mg tablet  Take 1 tablet (10 mg) by mouth once daily.     meclizine 25 mg tablet  Commonly known as: Antivert  Take 1 tablet (25 mg) by mouth 3 times a day as needed for dizziness.     traMADol 50 mg tablet  Commonly known as: Ultram                              NPO Instructions:    Do not eat any food after midnight the night before your surgery/procedure.    Additional Instructions:     Wear  comfortable loose fitting clothing  Do not use moisturizers, creams, lotions or perfume  All jewelry and valuables should be left at home    Take lisinopril with a sip of water in the morning before surgery  Must have a                                              From: Chino Jackson  To: Severo Powers MD  Sent: 2/28/2019 8:36 AM CST  Subject: Visit Kei Rodriguez Dr,  My mom has been going to speech therapy and her swallowing has improved all food and medication is by mouth . would like to see if

## 2024-01-24 ENCOUNTER — HOSPITAL ENCOUNTER (OUTPATIENT)
Dept: RADIOLOGY | Facility: HOSPITAL | Age: 79
Discharge: HOME | End: 2024-01-24
Payer: MEDICARE

## 2024-01-24 DIAGNOSIS — D05.11 DUCTAL CARCINOMA IN SITU (DCIS) OF RIGHT BREAST: ICD-10-CM

## 2024-01-24 PROCEDURE — 2780000003 HC OR 278 NO HCPCS

## 2024-01-24 PROCEDURE — 19281 PERQ DEVICE BREAST 1ST IMAG: CPT | Mod: RIGHT SIDE | Performed by: RADIOLOGY

## 2024-01-24 PROCEDURE — 19281 PERQ DEVICE BREAST 1ST IMAG: CPT | Mod: RT

## 2024-01-24 PROCEDURE — A4648 IMPLANTABLE TISSUE MARKER: HCPCS

## 2024-01-30 ENCOUNTER — ANESTHESIA EVENT (OUTPATIENT)
Dept: OPERATING ROOM | Facility: HOSPITAL | Age: 79
End: 2024-01-30
Payer: MEDICARE

## 2024-01-31 ENCOUNTER — HOSPITAL ENCOUNTER (OUTPATIENT)
Dept: RADIOLOGY | Facility: HOSPITAL | Age: 79
Setting detail: OUTPATIENT SURGERY
Discharge: HOME | End: 2024-01-31
Payer: MEDICARE

## 2024-01-31 ENCOUNTER — ANESTHESIA (OUTPATIENT)
Dept: OPERATING ROOM | Facility: HOSPITAL | Age: 79
End: 2024-01-31
Payer: MEDICARE

## 2024-01-31 ENCOUNTER — HOSPITAL ENCOUNTER (OUTPATIENT)
Facility: HOSPITAL | Age: 79
Setting detail: OUTPATIENT SURGERY
Discharge: HOME | End: 2024-01-31
Attending: SURGERY | Admitting: SURGERY
Payer: MEDICARE

## 2024-01-31 ENCOUNTER — APPOINTMENT (OUTPATIENT)
Dept: CARDIOLOGY | Facility: HOSPITAL | Age: 79
End: 2024-01-31
Payer: MEDICARE

## 2024-01-31 ENCOUNTER — PHARMACY VISIT (OUTPATIENT)
Dept: PHARMACY | Facility: CLINIC | Age: 79
End: 2024-01-31
Payer: COMMERCIAL

## 2024-01-31 VITALS
WEIGHT: 135 LBS | DIASTOLIC BLOOD PRESSURE: 55 MMHG | HEART RATE: 84 BPM | HEIGHT: 59 IN | RESPIRATION RATE: 16 BRPM | BODY MASS INDEX: 27.21 KG/M2 | TEMPERATURE: 98.1 F | SYSTOLIC BLOOD PRESSURE: 118 MMHG | OXYGEN SATURATION: 96 %

## 2024-01-31 DIAGNOSIS — D05.11 DUCTAL CARCINOMA IN SITU (DCIS) OF RIGHT BREAST: Primary | ICD-10-CM

## 2024-01-31 DIAGNOSIS — Z98.890 S/P LUMPECTOMY, RIGHT BREAST: ICD-10-CM

## 2024-01-31 DIAGNOSIS — D05.11 INTRADUCTAL CARCINOMA IN SITU OF RIGHT BREAST: ICD-10-CM

## 2024-01-31 DIAGNOSIS — D05.10 DCIS (DUCTAL CARCINOMA IN SITU): ICD-10-CM

## 2024-01-31 LAB
ATRIAL RATE: 84 BPM
P AXIS: 50 DEGREES
PR INTERVAL: 155 MS
Q ONSET: 251 MS
QRS COUNT: 13 BEATS
QRS DURATION: 99 MS
QT INTERVAL: 381 MS
QTC CALCULATION(BAZETT): 451 MS
QTC FREDERICIA: 426 MS
R AXIS: -21 DEGREES
T AXIS: -4 DEGREES
T OFFSET: 441 MS
VENTRICULAR RATE: 84 BPM

## 2024-01-31 PROCEDURE — 2500000004 HC RX 250 GENERAL PHARMACY W/ HCPCS (ALT 636 FOR OP/ED): Performed by: ANESTHESIOLOGY

## 2024-01-31 PROCEDURE — 2500000004 HC RX 250 GENERAL PHARMACY W/ HCPCS (ALT 636 FOR OP/ED): Performed by: SURGERY

## 2024-01-31 PROCEDURE — 3430000001 HC RX 343 DIAGNOSTIC RADIOPHARMACEUTICALS: Performed by: NUCLEAR MEDICINE

## 2024-01-31 PROCEDURE — 76098 X-RAY EXAM SURGICAL SPECIMEN: CPT | Performed by: RADIOLOGY

## 2024-01-31 PROCEDURE — 2500000005 HC RX 250 GENERAL PHARMACY W/O HCPCS: Performed by: NURSE ANESTHETIST, CERTIFIED REGISTERED

## 2024-01-31 PROCEDURE — 93005 ELECTROCARDIOGRAM TRACING: CPT | Mod: 59

## 2024-01-31 PROCEDURE — 88307 TISSUE EXAM BY PATHOLOGIST: CPT | Mod: TC,SUR,PORLAB | Performed by: SURGERY

## 2024-01-31 PROCEDURE — 2500000005 HC RX 250 GENERAL PHARMACY W/O HCPCS: Performed by: SURGERY

## 2024-01-31 PROCEDURE — 2720000007 HC OR 272 NO HCPCS: Performed by: SURGERY

## 2024-01-31 PROCEDURE — 3600000009 HC OR TIME - EACH INCREMENTAL 1 MINUTE - PROCEDURE LEVEL FOUR: Performed by: SURGERY

## 2024-01-31 PROCEDURE — RXMED WILLOW AMBULATORY MEDICATION CHARGE

## 2024-01-31 PROCEDURE — 7100000001 HC RECOVERY ROOM TIME - INITIAL BASE CHARGE: Performed by: SURGERY

## 2024-01-31 PROCEDURE — 3700000002 HC GENERAL ANESTHESIA TIME - EACH INCREMENTAL 1 MINUTE: Performed by: SURGERY

## 2024-01-31 PROCEDURE — 7100000002 HC RECOVERY ROOM TIME - EACH INCREMENTAL 1 MINUTE: Performed by: SURGERY

## 2024-01-31 PROCEDURE — A9520 TC99 TILMANOCEPT DIAG 0.5MCI: HCPCS | Performed by: NUCLEAR MEDICINE

## 2024-01-31 PROCEDURE — 88307 TISSUE EXAM BY PATHOLOGIST: CPT | Performed by: PATHOLOGY

## 2024-01-31 PROCEDURE — 7100000009 HC PHASE TWO TIME - INITIAL BASE CHARGE: Performed by: SURGERY

## 2024-01-31 PROCEDURE — 2500000004 HC RX 250 GENERAL PHARMACY W/ HCPCS (ALT 636 FOR OP/ED): Performed by: NURSE ANESTHETIST, CERTIFIED REGISTERED

## 2024-01-31 PROCEDURE — 38792 RA TRACER ID OF SENTINL NODE: CPT

## 2024-01-31 PROCEDURE — 3600000004 HC OR TIME - INITIAL BASE CHARGE - PROCEDURE LEVEL FOUR: Performed by: SURGERY

## 2024-01-31 PROCEDURE — 93010 ELECTROCARDIOGRAM REPORT: CPT | Performed by: INTERNAL MEDICINE

## 2024-01-31 PROCEDURE — 7100000010 HC PHASE TWO TIME - EACH INCREMENTAL 1 MINUTE: Performed by: SURGERY

## 2024-01-31 PROCEDURE — 76098 X-RAY EXAM SURGICAL SPECIMEN: CPT

## 2024-01-31 PROCEDURE — 3700000001 HC GENERAL ANESTHESIA TIME - INITIAL BASE CHARGE: Performed by: SURGERY

## 2024-01-31 RX ORDER — BUPIVACAINE HYDROCHLORIDE 5 MG/ML
INJECTION, SOLUTION EPIDURAL; INTRACAUDAL AS NEEDED
Status: DISCONTINUED | OUTPATIENT
Start: 2024-01-31 | End: 2024-01-31 | Stop reason: HOSPADM

## 2024-01-31 RX ORDER — SODIUM CHLORIDE, SODIUM LACTATE, POTASSIUM CHLORIDE, CALCIUM CHLORIDE 600; 310; 30; 20 MG/100ML; MG/100ML; MG/100ML; MG/100ML
50 INJECTION, SOLUTION INTRAVENOUS CONTINUOUS
Status: DISCONTINUED | OUTPATIENT
Start: 2024-01-31 | End: 2024-01-31 | Stop reason: HOSPADM

## 2024-01-31 RX ORDER — PROPOFOL 10 MG/ML
INJECTION, EMULSION INTRAVENOUS AS NEEDED
Status: DISCONTINUED | OUTPATIENT
Start: 2024-01-31 | End: 2024-01-31

## 2024-01-31 RX ORDER — ONDANSETRON HYDROCHLORIDE 2 MG/ML
4 INJECTION, SOLUTION INTRAVENOUS ONCE
Status: COMPLETED | OUTPATIENT
Start: 2024-01-31 | End: 2024-01-31

## 2024-01-31 RX ORDER — ACETAMINOPHEN 325 MG/1
650 TABLET ORAL EVERY 6 HOURS PRN
Qty: 20 TABLET | Refills: 0 | Status: SHIPPED | OUTPATIENT
Start: 2024-01-31 | End: 2024-02-10

## 2024-01-31 RX ORDER — FENTANYL CITRATE 50 UG/ML
INJECTION, SOLUTION INTRAMUSCULAR; INTRAVENOUS AS NEEDED
Status: DISCONTINUED | OUTPATIENT
Start: 2024-01-31 | End: 2024-01-31

## 2024-01-31 RX ORDER — PHENYLEPHRINE HCL IN 0.9% NACL 1 MG/10 ML
SYRINGE (ML) INTRAVENOUS AS NEEDED
Status: DISCONTINUED | OUTPATIENT
Start: 2024-01-31 | End: 2024-01-31

## 2024-01-31 RX ORDER — CLINDAMYCIN PHOSPHATE 900 MG/50ML
900 INJECTION, SOLUTION INTRAVENOUS ONCE
Status: COMPLETED | OUTPATIENT
Start: 2024-01-31 | End: 2024-01-31

## 2024-01-31 RX ORDER — FAMOTIDINE 10 MG/ML
20 INJECTION INTRAVENOUS ONCE
Status: COMPLETED | OUTPATIENT
Start: 2024-01-31 | End: 2024-01-31

## 2024-01-31 RX ORDER — OXYCODONE HYDROCHLORIDE 5 MG/1
5 TABLET ORAL EVERY 6 HOURS PRN
Qty: 12 TABLET | Refills: 0 | Status: SHIPPED | OUTPATIENT
Start: 2024-01-31 | End: 2024-02-07

## 2024-01-31 RX ORDER — NORETHINDRONE AND ETHINYL ESTRADIOL 0.5-0.035
KIT ORAL AS NEEDED
Status: DISCONTINUED | OUTPATIENT
Start: 2024-01-31 | End: 2024-01-31

## 2024-01-31 RX ORDER — LIDOCAINE HYDROCHLORIDE 20 MG/ML
INJECTION, SOLUTION INFILTRATION; PERINEURAL AS NEEDED
Status: DISCONTINUED | OUTPATIENT
Start: 2024-01-31 | End: 2024-01-31

## 2024-01-31 RX ORDER — DOCUSATE SODIUM 100 MG/1
100 CAPSULE, LIQUID FILLED ORAL 2 TIMES DAILY
Qty: 10 CAPSULE | Refills: 0 | Status: SHIPPED | OUTPATIENT
Start: 2024-01-31 | End: 2024-02-05

## 2024-01-31 RX ORDER — ONDANSETRON HYDROCHLORIDE 2 MG/ML
4 INJECTION, SOLUTION INTRAVENOUS ONCE AS NEEDED
Status: DISCONTINUED | OUTPATIENT
Start: 2024-01-31 | End: 2024-01-31 | Stop reason: HOSPADM

## 2024-01-31 RX ORDER — LIDOCAINE HYDROCHLORIDE AND EPINEPHRINE 10; 10 MG/ML; UG/ML
INJECTION, SOLUTION INFILTRATION; PERINEURAL AS NEEDED
Status: DISCONTINUED | OUTPATIENT
Start: 2024-01-31 | End: 2024-01-31 | Stop reason: HOSPADM

## 2024-01-31 RX ADMIN — CLINDAMYCIN PHOSPHATE 900 MG: 900 INJECTION, SOLUTION INTRAVENOUS at 10:42

## 2024-01-31 RX ADMIN — LIDOCAINE HYDROCHLORIDE 40 MG: 20 INJECTION, SOLUTION INFILTRATION; PERINEURAL at 10:58

## 2024-01-31 RX ADMIN — HYDROMORPHONE HYDROCHLORIDE 0.5 MG: 0.5 INJECTION, SOLUTION INTRAMUSCULAR; INTRAVENOUS; SUBCUTANEOUS at 12:42

## 2024-01-31 RX ADMIN — TILMANOCEPT 0.5 MILLICURIE: KIT at 10:15

## 2024-01-31 RX ADMIN — FAMOTIDINE 20 MG: 10 INJECTION, SOLUTION INTRAVENOUS at 09:46

## 2024-01-31 RX ADMIN — ONDANSETRON 4 MG: 2 INJECTION INTRAMUSCULAR; INTRAVENOUS at 09:46

## 2024-01-31 RX ADMIN — HYDROMORPHONE HYDROCHLORIDE 0.5 MG: 0.5 INJECTION, SOLUTION INTRAMUSCULAR; INTRAVENOUS; SUBCUTANEOUS at 12:34

## 2024-01-31 RX ADMIN — EPHEDRINE SULFATE 10 MG: 50 INJECTION, SOLUTION INTRAVENOUS at 10:59

## 2024-01-31 RX ADMIN — FENTANYL CITRATE 50 MCG: 50 INJECTION, SOLUTION INTRAMUSCULAR; INTRAVENOUS at 10:58

## 2024-01-31 RX ADMIN — SODIUM CHLORIDE, POTASSIUM CHLORIDE, SODIUM LACTATE AND CALCIUM CHLORIDE 50 ML/HR: 600; 310; 30; 20 INJECTION, SOLUTION INTRAVENOUS at 09:46

## 2024-01-31 RX ADMIN — Medication 100 MCG: at 10:50

## 2024-01-31 RX ADMIN — EPHEDRINE SULFATE 15 MG: 50 INJECTION, SOLUTION INTRAVENOUS at 11:07

## 2024-01-31 RX ADMIN — PROPOFOL 150 MG: 10 INJECTION, EMULSION INTRAVENOUS at 10:58

## 2024-01-31 SDOH — HEALTH STABILITY: MENTAL HEALTH: CURRENT SMOKER: 0

## 2024-01-31 ASSESSMENT — PAIN SCALES - GENERAL
PAINLEVEL_OUTOF10: 0 - NO PAIN
PAINLEVEL_OUTOF10: 7
PAINLEVEL_OUTOF10: 3
PAINLEVEL_OUTOF10: 0 - NO PAIN
PAINLEVEL_OUTOF10: 0 - NO PAIN
PAINLEVEL_OUTOF10: 3
PAINLEVEL_OUTOF10: 3
PAINLEVEL_OUTOF10: 8
PAIN_LEVEL: 8

## 2024-01-31 ASSESSMENT — PAIN DESCRIPTION - LOCATION: LOCATION: BREAST

## 2024-01-31 ASSESSMENT — PAIN - FUNCTIONAL ASSESSMENT
PAIN_FUNCTIONAL_ASSESSMENT: 0-10

## 2024-01-31 ASSESSMENT — COLUMBIA-SUICIDE SEVERITY RATING SCALE - C-SSRS
1. IN THE PAST MONTH, HAVE YOU WISHED YOU WERE DEAD OR WISHED YOU COULD GO TO SLEEP AND NOT WAKE UP?: NO
2. HAVE YOU ACTUALLY HAD ANY THOUGHTS OF KILLING YOURSELF?: NO
6. HAVE YOU EVER DONE ANYTHING, STARTED TO DO ANYTHING, OR PREPARED TO DO ANYTHING TO END YOUR LIFE?: NO

## 2024-01-31 ASSESSMENT — PAIN DESCRIPTION - ORIENTATION: ORIENTATION: RIGHT

## 2024-01-31 NOTE — SIGNIFICANT EVENT
Pt. In resting comfortably in bed,  Was in room to see pt at 0918, pt. Was marked and questions answered. Signed consent is on chart.   Pt. Family at bedside.   Pt. Appears to be stable and calm at this time. Pt. Aware of plan. Nuclear med called by RN, nuclear to come take pt to procedure.     Will continue to monitor pt.

## 2024-01-31 NOTE — H&P
History Of Present Illness  Lynn Oneal is a 78 y.o. female presenting with DCIS of right breast.     Past Medical History  Past Medical History:   Diagnosis Date    Breast cancer (CMS/HCC)     2009    left lumpectomy    CKD (chronic kidney disease)     Fibromyalgia, primary     GERD (gastroesophageal reflux disease)     Hypertension     Personal history of malignant neoplasm of breast     History of malignant neoplasm of breast    Vertigo        Surgical History  Past Surgical History:   Procedure Laterality Date    BI STEREOTACTIC GUIDED BREAST RIGHT LOCALIZATION AND BIOPSY Right 12/13/2023    BI STEREOTACTIC GUIDED BREAST RIGHT LOCALIZATION AND BIOPSY 12/13/2023 David Arriola MD [facility]    BREAST LUMPECTOMY Left     CHOLECYSTECTOMY N/A     OTHER SURGICAL HISTORY  07/30/2020    Tumor excision...stomach    OTHER SURGICAL HISTORY  07/30/2020    Hysterectomy    OTHER SURGICAL HISTORY  07/30/2020    Lumpectomy    OTHER SURGICAL HISTORY  08/27/2020    Ankle fracture repair        Social History  She reports that she has never smoked. She has never used smokeless tobacco. No history on file for alcohol use and drug use.    Family History  Family History   Problem Relation Name Age of Onset    Breast cancer Sister          Allergies  Aspirin, Latex, Penicillins, and Shellfish containing products    Review of Systems     Physical Exam  Eyes:      Extraocular Movements: Extraocular movements intact.      Pupils: Pupils are equal, round, and reactive to light.   Cardiovascular:      Rate and Rhythm: Normal rate and regular rhythm.   Pulmonary:      Effort: Pulmonary effort is normal.   Abdominal:      Palpations: Abdomen is soft.   Skin:     General: Skin is warm and dry.   Neurological:      Mental Status: She is alert.   Psychiatric:         Mood and Affect: Mood normal.         Behavior: Behavior normal.          Last Recorded Vitals  There were no vitals taken for this visit.    Relevant Results              Assessment/Plan   Active Problems:  There are no active Hospital Problems.      DCIS of right breast here for lumpectomy and sentinal node biopsy       I spent 20 minutes in the professional and overall care of this patient.      Yasmine Melara MD

## 2024-01-31 NOTE — OP NOTE
RIGHT SIDED LUMPECTOMY WITH RIGHT BREAST MAGSEED PLACEMENT AND  RIGHT SENTINEL LYMPH NODE BIOPSY. 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 **PREOP 9:00, NUCLEAR 10:00, SURGERY 11:00**  73468 - MO MASTECTOMY PARTIAL    RIGHT SIDED LUMPECTOMY WITH RIGHT BREAST MAGSEED PLACEMENT AND  RIGHT SENTINEL LYMPH NODE BIOPSY. **PREOP 9:00, NUCLEAR 10:00, SURGERY 11:00**  04141 - MO BX/EXC LYMPH NODE OPEN SUPERFICIAL      Surgeons      * Yasmine Melara - Primary    Resident/Fellow/Other Assistant:  Surgeon(s) and Role:    Procedure Summary  Anesthesia: General  ASA: III  Anesthesia Staff: CRNA: STEPH Mcdonnell-CRNA  Estimated Blood Loss: 2mL  Intra-op Medications: Administrations occurring from 1100 to 1300 on 24:  * No intraprocedure medications in log *           Anesthesia Record               Intraprocedure I/O Totals       None           Specimen:   ID Type Source Tests Collected by Time   1 : sentinel lymph node 1 Tissue SENTINEL LYMPH NODE BREAST RIGHT SURGICAL PATHOLOGY EXAM Yasmine Melara MD 2024 1122   2 : RIGHT BREAST LUMPECTOMY- SHORT STITCH SUPERIOR- LONG STITCH LATERAL, WITH DEEP MARGIN TISSUE Tissue BREAST LUMPECTOMY RIGHT SURGICAL PATHOLOGY EXAM Yasmine Melara MD 2024 1144        Staff:   Circulator: Alana Kent RN  Relief Circulator: Gena Tanner RN  Scrub Person: Nabila Sanchez; Deena Meza         Drains and/or Catheters: * None in log *    Tourniquet Times:         Implants:     Findings: Mag seed clip and mass all within specimen x-rayed    Indications: Lynn Oneal is an 78 y.o. 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     The patient was seen in the preoperative area. The risks, benefits, complications, treatment options, non-operative alternatives, expected recovery and outcomes were discussed with the patient. The possibilities of reaction to medication, pulmonary aspiration, injury to surrounding structures, bleeding, recurrent infection, the need for additional procedures, failure to diagnose a condition, and creating a complication requiring transfusion or operation were discussed with the patient. The patient concurred with the proposed plan, giving informed consent.  The site of surgery was properly noted/marked if necessary per policy. The patient has been actively warmed in preoperative area. Preoperative antibiotics have been ordered and given within 1 hours of incision. Venous thrombosis prophylaxis have been ordered including bilateral sequential compression devices    Procedure Details  Procedure:  Pt taken to OR placed supine, general anesthesia administered.  LMA placed.  Pt injected with Methylene blue circumareolar in 4 sites 1 ml of 3 to 1 diluted methylene blue injected.  This was massaged.  Pt then prepped  with Hibiclins and draped in sterile fashion.  The Pt had been injected pre-op with nuclear medicine.  Gamma probe was used to isolate the sentinal node.  Incision sites were marked and localized with 10  ml of Marcaine for post op anesthesia.  Incision made in right axilla, the probe and palpation were used to identify the sentinal node this required extensive dissection.  Eventually the node was found and removed it measured 75 on Gamma probe and was lightly blue colored. This wound was irrigated with sterile water. Skin was closed with 4-0 Vicryl.    Attention turned to the breast lesion, incision was made in skin carried down through skin subcutaneous tissue the mass was excised with a wide margin of normal tissue surrounding it.  The  magnetic probe was used to identify the mag seed with in the tissue.  This was placed in blue tub and Xray was taken in the room.  Confirming the seed clip and mass were within the specimen by the radiologist.  This was placed in formalin and sent to pathology.  Wound irrigated and hemostasis achieved with electrocautery.  Subcutaneous tissue closed with 3-0 Vicryl and skin closed with 4-0 Vicryl.  Steri-strips and dressings applied.  Pt awakened and LMA removed.  Taken to PACU in stable condition.:   Complications:  None; patient tolerated the procedure well.    Disposition: PACU - hemodynamically stable.  Condition: stable     Van Node Biopsy for Breast Cancer  Operation performed with curative intent Yes   Tracer(s) used to identify sentinel nodes in the upfront surgery (non-neoadjuvant) setting Dye and tracer   Tracer(s) used to identify sentinel nodes in the neoadjuvant setting N/A   All nodes (colored or non-colored) present at the end of a dye-filled lymphatic channel were removed Yes   All significantly radioactive nodes were removed Yes   All palpably suspicious nodes were removed Yes   Biopsy-proven positive nodes marked with clips prior to chemotherapy were identified and removed N/A         Additional Details: above    Attending Attestation: I was present and scrubbed for the entire procedure.    Yasmine Melara  Phone Number: [phone]

## 2024-01-31 NOTE — ANESTHESIA PROCEDURE NOTES
Airway  Date/Time: 1/31/2024 10:45 AM  Urgency: elective    Airway not difficult    Staffing  Performed: CRNA   Authorized by: DINANE Mcdonnell    Performed by: DIANNE Mcdonnell  Patient location during procedure: OR    Indications and Patient Condition  Indications for airway management: anesthesia  Spontaneous ventilation: present  Sedation level: deep  Preoxygenated: yes  Patient position: sniffing  Mask difficulty assessment: 1 - vent by mask    Final Airway Details  Final airway type: supraglottic airway      Successful airway: classic  Size 4     Number of attempts at approach: 1

## 2024-01-31 NOTE — ANESTHESIA PREPROCEDURE EVALUATION
Patient: Lynn Oneal    Procedure Information       Date/Time: 01/31/24 1100    Procedure: RIGHT SIDED LUMPECTOMY WITH RIGHT BREAST MAGSEED PLACEMENT AND  RIGHT SENTINEL LYMPH NODE BIOPSY. **PREOP 9:00, NUCLEAR 10:00, SURGERY 11:00** (Right: Breast) - 90 MIN    Location: [facility] / [facility]    Surgeons: Yasmine Melara MD            Relevant Problems   Cardiovascular   (+) Essential hypertension      /Renal   (+) Chronic kidney disease      Musculoskeletal   (+) Fibromyalgia       Clinical information reviewed:    Allergies  Meds  Problems  Med Hx             NPO Detail:  No data recorded     Physical Exam    Airway  Mallampati: II  TM distance: >3 FB  Neck ROM: full     Cardiovascular - normal exam     Dental   (+) upper dentures, lower dentures     Pulmonary - normal exam     Abdominal            Anesthesia Plan    History of general anesthesia?: yes  History of complications of general anesthesia?: no    ASA 3     general     The patient is not a current smoker.    intravenous induction   Anesthetic plan and risks discussed with patient.  Use of blood products discussed with patient who consented to blood products.    Plan discussed with CRNA.

## 2024-01-31 NOTE — ANESTHESIA POSTPROCEDURE EVALUATION
Patient: Lynn Oneal    Procedure Summary       Date: 01/31/24 Room / Location: [facility] / [facility]    Anesthesia Start: 1042 Anesthesia Stop: 1213    Procedure: RIGHT SIDED LUMPECTOMY WITH RIGHT BREAST MAGSEED PLACEMENT AND  RIGHT SENTINEL LYMPH NODE BIOPSY. 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    Anesthesia Post Evaluation    Patient location during evaluation: PACU  Patient participation: complete - patient participated  Level of consciousness: awake and alert  Pain score: 8  Pain management: adequate  Airway patency: patent  Cardiovascular status: acceptable  Respiratory status: acceptable  Hydration status: acceptable  Postoperative Nausea and Vomiting: none  Comments: VITAL SIGNS DO NOT REFLECT INTENSITY OF PAIN PROCLAIMED    No notable events documented.

## 2024-02-01 ASSESSMENT — PAIN SCALES - GENERAL: PAINLEVEL_OUTOF10: 0 - NO PAIN

## 2024-02-07 LAB
LABORATORY COMMENT REPORT: NORMAL
PATH REPORT.FINAL DX SPEC: NORMAL
PATH REPORT.GROSS SPEC: NORMAL
PATH REPORT.RELEVANT HX SPEC: NORMAL
PATH REPORT.TOTAL CANCER: NORMAL
PATHOLOGY SYNOPTIC REPORT: NORMAL

## 2024-02-24 DIAGNOSIS — M19.91 PRIMARY OSTEOARTHRITIS, UNSPECIFIED SITE: ICD-10-CM

## 2024-02-24 DIAGNOSIS — M79.7 FIBROMYALGIA: ICD-10-CM

## 2024-02-26 RX ORDER — TRAMADOL HYDROCHLORIDE 50 MG/1
TABLET ORAL
Qty: 30 TABLET | Refills: 0 | Status: SHIPPED | OUTPATIENT
Start: 2024-02-26 | End: 2024-03-06 | Stop reason: SDUPTHER

## 2024-03-06 ENCOUNTER — OFFICE VISIT (OUTPATIENT)
Dept: PRIMARY CARE | Facility: CLINIC | Age: 79
End: 2024-03-06
Payer: MEDICARE

## 2024-03-06 ENCOUNTER — TELEPHONE (OUTPATIENT)
Dept: PRIMARY CARE | Facility: CLINIC | Age: 79
End: 2024-03-06

## 2024-03-06 VITALS
TEMPERATURE: 97.2 F | SYSTOLIC BLOOD PRESSURE: 122 MMHG | BODY MASS INDEX: 26.66 KG/M2 | WEIGHT: 132 LBS | HEART RATE: 90 BPM | OXYGEN SATURATION: 96 % | DIASTOLIC BLOOD PRESSURE: 80 MMHG

## 2024-03-06 DIAGNOSIS — N18.31 STAGE 3A CHRONIC KIDNEY DISEASE (MULTI): ICD-10-CM

## 2024-03-06 DIAGNOSIS — M19.91 PRIMARY OSTEOARTHRITIS, UNSPECIFIED SITE: ICD-10-CM

## 2024-03-06 DIAGNOSIS — K21.00 GASTROESOPHAGEAL REFLUX DISEASE WITH ESOPHAGITIS WITHOUT HEMORRHAGE: ICD-10-CM

## 2024-03-06 DIAGNOSIS — K59.03 DRUG-INDUCED CONSTIPATION: ICD-10-CM

## 2024-03-06 DIAGNOSIS — D05.11 DUCTAL CARCINOMA IN SITU (DCIS) OF RIGHT BREAST: Primary | ICD-10-CM

## 2024-03-06 DIAGNOSIS — I10 PRIMARY HYPERTENSION: ICD-10-CM

## 2024-03-06 DIAGNOSIS — R42 VERTIGO: ICD-10-CM

## 2024-03-06 DIAGNOSIS — M79.7 FIBROMYALGIA: ICD-10-CM

## 2024-03-06 PROBLEM — E55.9 VITAMIN D DEFICIENCY: Status: ACTIVE | Noted: 2023-07-05

## 2024-03-06 PROCEDURE — 1159F MED LIST DOCD IN RCRD: CPT | Performed by: FAMILY MEDICINE

## 2024-03-06 PROCEDURE — 1036F TOBACCO NON-USER: CPT | Performed by: FAMILY MEDICINE

## 2024-03-06 PROCEDURE — 3079F DIAST BP 80-89 MM HG: CPT | Performed by: FAMILY MEDICINE

## 2024-03-06 PROCEDURE — 99214 OFFICE O/P EST MOD 30 MIN: CPT | Performed by: FAMILY MEDICINE

## 2024-03-06 PROCEDURE — 1126F AMNT PAIN NOTED NONE PRSNT: CPT | Performed by: FAMILY MEDICINE

## 2024-03-06 PROCEDURE — 3074F SYST BP LT 130 MM HG: CPT | Performed by: FAMILY MEDICINE

## 2024-03-06 RX ORDER — LISINOPRIL 10 MG/1
10 TABLET ORAL DAILY
Qty: 90 TABLET | Refills: 3 | Status: SHIPPED | OUTPATIENT
Start: 2024-03-06 | End: 2024-04-02

## 2024-03-06 RX ORDER — FAMOTIDINE 40 MG/1
40 TABLET, FILM COATED ORAL 2 TIMES DAILY
Qty: 180 TABLET | Refills: 1 | Status: SHIPPED | OUTPATIENT
Start: 2024-03-06

## 2024-03-06 RX ORDER — TRAMADOL HYDROCHLORIDE 50 MG/1
50 TABLET ORAL EVERY 8 HOURS PRN
Qty: 90 TABLET | Refills: 0 | Status: SHIPPED | OUTPATIENT
Start: 2024-03-06 | End: 2024-04-22 | Stop reason: SDUPTHER

## 2024-03-06 RX ORDER — DOCUSATE SODIUM 100 MG/1
100 CAPSULE, LIQUID FILLED ORAL 2 TIMES DAILY
Qty: 180 CAPSULE | Refills: 1 | Status: SHIPPED | OUTPATIENT
Start: 2024-03-06

## 2024-03-06 RX ORDER — MECLIZINE HYDROCHLORIDE 25 MG/1
25 TABLET ORAL 3 TIMES DAILY PRN
Qty: 30 TABLET | Refills: 2 | Status: SHIPPED | OUTPATIENT
Start: 2024-03-06

## 2024-03-06 ASSESSMENT — ENCOUNTER SYMPTOMS: HYPERTENSION: 1

## 2024-03-06 NOTE — PROGRESS NOTES
cSubjective   Patient ID: Lynn Oneal is a 78 y.o. female who presents for GERD, Hypertension, and Prediabetes.  Hypertension      HTN: well controlled  Fibromyalgia: helped by tramadol which she takes rarely.  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  Still has to find out next steps for treatment.  GERD: stable  Vertigo: taking meclizine prn    Patient Active Problem List   Diagnosis    Chronic kidney disease    Essential hypertension    Fibromyalgia    Impaired fasting glucose    Prediabetes    Gastroesophageal reflux disease with esophagitis    Vitamin D deficiency       Social Connections: Not on file       Current Outpatient Medications on File Prior to Visit   Medication Sig Dispense Refill    cholecalciferol (Vitamin D-3) 25 MCG (1000 UT) capsule Take by mouth.      [DISCONTINUED] famotidine (Pepcid) 40 mg tablet TAKE 1 TABLET BY MOUTH 2 TIMES A  tablet 1    [DISCONTINUED] lisinopril 10 mg tablet Take 1 tablet (10 mg) by mouth once daily. 90 tablet 3    [DISCONTINUED] meclizine (Antivert) 25 mg tablet Take 1 tablet (25 mg) by mouth 3 times a day as needed for dizziness. 30 tablet 2    [DISCONTINUED] traMADol (Ultram) 50 mg tablet TAKE 1 TABLET (50 MG) BY MOUTH EVERY 8 HOURS IF NEEDED FOR SEVERE PAIN (7 - 10 ON PAIN SCALE) 30 tablet 0     No current facility-administered medications on file prior to visit.        Vitals:    03/06/24 1448   BP: 122/80   Pulse: 90   Temp: 36.2 °C (97.2 °F)   SpO2: 96%     Vitals:    03/06/24 1448   Weight: 59.9 kg (132 lb)       Review of Systems   All other systems reviewed and are negative.      Objective     Physical Exam  Constitutional:       Appearance: Normal appearance. She is well-developed.   HENT:      Head: Atraumatic.   Cardiovascular:      Rate and Rhythm: Normal rate and regular rhythm.      Heart sounds: Normal heart sounds. No murmur heard.  Pulmonary:      Effort: Pulmonary effort is normal.      Breath sounds: Normal breath sounds.   Abdominal:       General: Bowel sounds are normal.      Palpations: Abdomen is soft.   Skin:     General: Skin is warm.   Neurological:      General: No focal deficit present.      Mental Status: She is alert.   Psychiatric:         Mood and Affect: Mood normal.         Admission on 01/31/2024, Discharged on 01/31/2024   Component Date Value Ref Range Status    Ventricular Rate 01/31/2024 84  BPM Final    Atrial Rate 01/31/2024 84  BPM Final    MD Interval 01/31/2024 155  ms Final    QRS Duration 01/31/2024 99  ms Final    QT Interval 01/31/2024 381  ms Final    QTC Calculation(Bazett) 01/31/2024 451  ms Final    P Axis 01/31/2024 50  degrees Final    R Axis 01/31/2024 -21  degrees Final    T Axis 01/31/2024 -4  degrees Final    QRS Count 01/31/2024 13  beats Final    Q Onset 01/31/2024 251  ms Final    T Offset 01/31/2024 441  ms Final    QTC Fredericia 01/31/2024 426  ms Final    Case Report 01/31/2024    Final                    Value:Surgical Pathology                                Case: [id number]                                  Authorizing Provider:  Yasmine Melara MD        Collected:           01/31/2024 1122              Ordering Location:     [facility]  Received:            01/31/2024 1245                                     OR                                                                           Pathologist:           Guadalupe Palmer MD                                                           Specimens:   A) - SENTINEL LYMPH NODE BREAST RIGHT, SENTINAL LYMPH NODE 1                                        B) - BREAST LUMPECTOMY RIGHT, RIGHT BREAST LUMPECTOMY- SHORT STITCH SUPERIOR- LONG                  STITCH LATERAL, WITH DEEP MARGIN TISSUE                                                    FINAL DIAGNOSIS 01/31/2024    Final                    Value:This result contains rich text formatting which cannot be displayed here.      01/31/2024    Final                    Value:This result contains rich  "text formatting which cannot be displayed here.    Case Summary Report 01/31/2024    Final                    Value:DCIS OF THE BREAST: Resection                            DCIS OF THE BREAST: RESECTION - All Specimens                            8th Edition - Protocol posted: 3/23/2022                                                        SPECIMEN                               Procedure:    Excision (less than total mastectomy)                                Specimen Laterality:    Right                                                         TUMOR                               Tumor Site:    Not specified                                Histologic Type:    Ductal carcinoma in situ                                Size (Extent) of DCIS:    Estimated size (extent) of DCIS is at least (Millimeters): as measured on prior biopsy ([id number]): 4 mm                               Architectural Patterns:    Cribriform                                Architectural Patterns:    Micropapillary                                Architectural Patterns:    Papillary                                Nuclear Grade:    Grade II (intermediate)                                Necrosis:    Present, central (expansive \"comedo\" necrosis)                                Microcalcifications:    Present in DCIS                                                         MARGINS                               Margin Status:    Not applicable (no DCIS in specimen)                                                         REGIONAL LYMPH NODES                               Regional Lymph Node Status:                                     :    All regional lymph nodes negative for tumor                                  Total Number of Lymph Nodes Examined (sentinel and non-sentinel):    1                                  Number of [place] Nodes Examined:    1                                                         PATHOLOGIC STAGE CLASSIFICATION (pTNM, AJCC 8th " Edition)                               Reporting of pT, pN, and (when applicable) pM categories is based on information available to the pathologist at the time the report is issued. As per the AJCC (Chapter 1, 8th Ed.) it is the managing physician’s responsibility to establish the final pathologic stage based upon all pertinent information, including but potentially not limited to this pathology report.                               pT Category:    pTis (DCIS)                                Regional Lymph Nodes Modifier:    (sn): [place] node(s) evaluated                                pN Category:    pN0                                                         ADDITIONAL FINDINGS                               Additional Findings:    Atypical ductal and lobular hyperplasia, flat epithelial atypia,Usual ductal hyperplasia, pseudoangiomatous stromal hyperplasia, columnar cell changes, intraductal papillomas, microcysts and apocrine metaplasia                                                            Breast Biomarker Testing Performed on Previous Biopsy:                                     Estrogen Receptor (ER) Status:    Positive                                    Percentage of Cells with Nuclear Positivity:    >95 %                                 Testing Performed on Case Number:    [id number] A4                                                            Comment(s):    Tumor block: [id number] A4       Clinical History 01/31/2024    Final                    Value:This result contains rich text formatting which cannot be displayed here.    Gross Description 01/31/2024    Final                    Value:This result contains rich text formatting which cannot be displayed here.       Assessment/Plan   Problem List Items Addressed This Visit             ICD-10-CM    Fibromyalgia M79.7    Relevant Medications    traMADol (Ultram) 50 mg tablet    Gastroesophageal reflux disease with esophagitis K21.00    Relevant  Medications    famotidine (Pepcid) 40 mg tablet     Other Visit Diagnoses         Codes    Ductal carcinoma in situ (DCIS) of right breast    -  Primary D05.11    Relevant Orders    Referral to Hematology and Oncology    Primary hypertension     I10    Relevant Medications    lisinopril 10 mg tablet    Vertigo     R42    Relevant Medications    meclizine (Antivert) 25 mg tablet    Primary osteoarthritis, unspecified site     M19.91    Relevant Medications    traMADol (Ultram) 50 mg tablet    Drug-induced constipation     K59.03    Relevant Medications    docusate sodium (Colace) 100 mg capsule          Ref to oncology.  Refilled other meds.  Colace for constipation.  Fu in 3 mo

## 2024-03-07 RX ORDER — DOCUSATE SODIUM 100 MG/1
100 CAPSULE, LIQUID FILLED ORAL 2 TIMES DAILY
Qty: 180 CAPSULE | Refills: 1 | OUTPATIENT
Start: 2024-03-07

## 2024-03-07 NOTE — TELEPHONE ENCOUNTER
Patient was informed of the providers message./[facility] Can you refuse the medicine.

## 2024-03-07 NOTE — TELEPHONE ENCOUNTER
Please let patient know that she can get some Colace over-the-counter and that her insurance does not cover it.

## 2024-03-15 ENCOUNTER — TELEPHONE (OUTPATIENT)
Dept: RADIOLOGY | Facility: HOSPITAL | Age: 79
End: 2024-03-15
Payer: MEDICARE

## 2024-03-15 NOTE — TELEPHONE ENCOUNTER
Patient contacted this RN Navigator. States she believes she was to follow up with Dr. Melara in April, however cannot locate contact/apt information for visit. Spoke to Justino at Dr. Melara's office who states patient is to contact provider office to set up follow up visit for May. Relayed information to patient and provided office number. Patient states understanding with correct read back of office number after review. Patient denies further questions or needs at this time.

## 2024-04-02 DIAGNOSIS — I10 PRIMARY HYPERTENSION: ICD-10-CM

## 2024-04-02 RX ORDER — LISINOPRIL 10 MG/1
10 TABLET ORAL DAILY
Qty: 90 TABLET | Refills: 3 | Status: SHIPPED | OUTPATIENT
Start: 2024-04-02

## 2024-04-12 ENCOUNTER — APPOINTMENT (OUTPATIENT)
Dept: HEMATOLOGY/ONCOLOGY | Facility: CLINIC | Age: 79
End: 2024-04-12
Payer: MEDICARE

## 2024-04-22 DIAGNOSIS — M19.91 PRIMARY OSTEOARTHRITIS, UNSPECIFIED SITE: ICD-10-CM

## 2024-04-22 DIAGNOSIS — M79.7 FIBROMYALGIA: ICD-10-CM

## 2024-04-22 RX ORDER — TRAMADOL HYDROCHLORIDE 50 MG/1
50 TABLET ORAL EVERY 8 HOURS PRN
Qty: 90 TABLET | Refills: 0 | Status: SHIPPED | OUTPATIENT
Start: 2024-04-22 | End: 2024-06-06 | Stop reason: SDUPTHER

## 2024-04-22 NOTE — TELEPHONE ENCOUNTER
Pt called rx line @ 9:45 and 9:53am requesting RF on Tramadol. Pt has 4 pills left. Out before OV. Please call once sent. [facility].    Next OV 6/6/24  Has 4 days left  Call pt once sent  Please advise. Thanks. JW

## 2024-04-29 NOTE — TELEPHONE ENCOUNTER
Wrote for a 2-month supply.  Patient to follow-up in a month and a half to talk about how things are going on it. Previously Declined (within the last year)

## 2024-05-08 NOTE — PROGRESS NOTES
Lynn Oneal female   1945 78 y.o.   [id number]      Chief Complaint    New Patient Visit          [facility]  Lynn Oneal is a 78 y.o.   female [facility]. 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 ER >95%.  1/31/2024 right breast lumpectomy with sentinel lymph node excision ([place]), final pathology not residual carcinoma, focal atypical ductal hyperplasia, focal atypical lobular hyperplasia, -0/1 negative sentinel lymph node.  STAGING 1/31/2024 [id number] AJCC 8th Edition    She has personal history of left breast cancer in 2009, treated with lumpectomy, node excision. She declined radiation and endocrine therapy.    She has not had genetic testing. She would like genetic testing.    BREAST IMAGING: None since breast surgery. Last bilateral screening mammogram was 10/11/2023.     REPRODUCTIVE HISTORY: menarche age 9, first birth age 15, menopause age 48, fatty breast tissue     FAMILY CANCER HISTORY:   Sister: breast cancer  Maternal grandmother: Breast cancer  Mother: throat cancer  Paternal grandmother: Cancer NOS  Brother; prostate cancer, lung cancer    REVIEW OF SYSTEMS    Constitutional:  Negative for appetite change, fatigue, fever and unexpected weight change.   HENT:  Negative for ear pain, hearing loss, nosebleeds, sore throat and trouble swallowing.    Eyes:  Negative for discharge, itching and visual disturbance.   Respiratory:  Negative for cough, chest tightness and shortness of breath.    Cardiovascular:  Negative for chest pain, palpitations and leg swelling.   Breast: as indicated in HPI  Gastrointestinal:  Negative for abdominal pain, constipation, diarrhea and nausea.   Endocrine: Negative for cold intolerance  and heat intolerance.   Genitourinary:  Negative for dysuria, frequency, hematuria, pelvic pain and vaginal bleeding.   Musculoskeletal:  Negative for arthralgias, back pain, gait problem, joint swelling and myalgias.   Skin:  Negative for color change and rash.   Allergic/Immunologic: Negative for environmental allergies and food allergies.   Neurological:  Negative for dizziness, tremors, speech difficulty, weakness, numbness and headaches.   Hematological:  Does not bruise/bleed easily.   Psychiatric/Behavioral:  Negative for agitation, dysphoric mood and sleep disturbance. The patient is not nervous/anxious.         MEDICATIONS  Current Outpatient Medications   Medication Instructions    cholecalciferol (Vitamin D-3) 25 MCG (1000 UT) capsule oral    docusate sodium (COLACE) 100 mg, oral, 2 times daily    famotidine (PEPCID) 40 mg, oral, 2 times daily    lisinopril 10 mg, oral, Daily    meclizine (ANTIVERT) 25 mg, oral, 3 times daily PRN    traMADol (ULTRAM) 50 mg, oral, Every 8 hours PRN        ALLERGIES  Allergies   Allergen Reactions    Aspirin Other and Unknown    Latex Hives, Itching and Unknown    Penicillins Unknown    Shellfish Containing Products Swelling, Rash and Wheezing        Past Medical History:   Diagnosis Date    Breast cancer (Multi)     2009    left lumpectomy    CKD (chronic kidney disease)     Fibromyalgia, primary     GERD (gastroesophageal reflux disease)     Hypertension     Personal history of malignant neoplasm of breast     History of malignant neoplasm of breast    Vertigo       Past Surgical History:   Procedure Laterality Date    BI STEREOTACTIC GUIDED BREAST RIGHT LOCALIZATION AND BIOPSY Right 12/13/2023    BI STEREOTACTIC GUIDED BREAST RIGHT LOCALIZATION AND BIOPSY 12/13/2023 David Arriola MD [facility]    BREAST LUMPECTOMY Left     CHOLECYSTECTOMY N/A     MASTECTOMY COMPLETE / SIMPLE Right 1/31/2024    Procedure: RIGHT SIDED LUMPECTOMY WITH RIGHT BREAST MAGSEED PLACEMENT AND   RIGHT SENTINEL LYMPH NODE BIOPSY. **PREOP 9:00, NUCLEAR 10:00, SURGERY 11:00**;  Surgeon: Yasmine Melara MD;  Location: [facility] OR;  Service: General Surgery;  Laterality: Right;  90 MIN    OTHER SURGICAL HISTORY  07/30/2020    Tumor excision...stomach    OTHER SURGICAL HISTORY  07/30/2020    Hysterectomy    OTHER SURGICAL HISTORY  07/30/2020    Lumpectomy    OTHER SURGICAL HISTORY  08/27/2020    Ankle fracture repair      Family History   Problem Relation Name Age of Onset    Breast cancer Sister            SOCIAL HISTORY      Social History     Tobacco Use    Smoking status: Never    Smokeless tobacco: Never   Substance Use Topics    Alcohol use: Not Currently        VITALS  Vitals:    05/10/24 0806   BP: 122/74   Pulse: 80        PHYSICAL EXAM  Patient is alert and oriented x3, with appropriate mood. The gait is steady and hand grasps are equal. Sclera clear. The right breast with healed partial circumareolar incision and right axillary incision.  Left breast healed superior lateral and large axillary incision.  The tissue is soft without palpable abnormalities, discrete nodules or masses. The skin and nipples appear normal. There is no cervical, supraclavicular, or axillary lymphadenopathy palpable. Heart rate and rhythm normal, S1 and S2 appreciated. The lungs are clear bilaterally. Abdomen is soft & non-tender.    Physical Exam     IMAGING N/A        ORDERS  Orders Placed This Encounter   Procedures    BI mammo bilateral diagnostic tomosynthesis     Standing Status:   Future     Standing Expiration Date:   7/10/2025     Order Specific Question:   Perform a breast ultrasound if clinically indicated by Radiologist?     Answer:   Yes     Order Specific Question:   Previous Mamm performed at  location?     Answer:   Yes     Order Specific Question:   Reason for exam:     Answer:   bilateral  breast cancer     Order Specific Question:   Radiologist to Determine Optimal Study     Answer:   Yes     Order Specific  Question:   [facility]     Answer:   Immediate [1]     Order Specific Question:   Is this exam part of a Research Study? If Yes, link this order to the research study     Answer:   No    Referral to Genetics     Standing Status:   Future     Standing Expiration Date:   11/10/2024     Referral Priority:   Routine     Referral Type:   Consultation     Referral Reason:   Consult and Treat     Requested Specialty:   Genetics     Number of Visits Requested:   1          ASSESSMENT/PLAN  1. Bilateral malignant neoplasm of breast in female, estrogen receptor positive, unspecified site of breast (Multi)  Referral to Genetics      2. Ductal carcinoma in situ (DCIS) of right breast  Referral to Hematology and Oncology    BI mammo bilateral diagnostic tomosynthesis             Follow up Proceed to bilateral diagnostic mammogram 11/2024 and follow up as scheduled with your surgeon.  Referral to genetics (bilateral breast cancer and family history)  Discussed radiation, she declined. Discussed endocrine therapy, she declined.      Josselin Jung, STEPH-[facility]

## 2024-05-10 ENCOUNTER — OFFICE VISIT (OUTPATIENT)
Dept: SURGICAL ONCOLOGY | Facility: CLINIC | Age: 79
End: 2024-05-10
Payer: MEDICARE

## 2024-05-10 VITALS
SYSTOLIC BLOOD PRESSURE: 122 MMHG | WEIGHT: 129.96 LBS | DIASTOLIC BLOOD PRESSURE: 74 MMHG | BODY MASS INDEX: 26.2 KG/M2 | HEART RATE: 80 BPM | HEIGHT: 59 IN

## 2024-05-10 DIAGNOSIS — D05.11 DUCTAL CARCINOMA IN SITU (DCIS) OF RIGHT BREAST: ICD-10-CM

## 2024-05-10 DIAGNOSIS — C50.912 BILATERAL MALIGNANT NEOPLASM OF BREAST IN FEMALE, ESTROGEN RECEPTOR POSITIVE, UNSPECIFIED SITE OF BREAST (MULTI): Primary | ICD-10-CM

## 2024-05-10 DIAGNOSIS — C50.911 BILATERAL MALIGNANT NEOPLASM OF BREAST IN FEMALE, ESTROGEN RECEPTOR POSITIVE, UNSPECIFIED SITE OF BREAST (MULTI): Primary | ICD-10-CM

## 2024-05-10 DIAGNOSIS — Z17.0 BILATERAL MALIGNANT NEOPLASM OF BREAST IN FEMALE, ESTROGEN RECEPTOR POSITIVE, UNSPECIFIED SITE OF BREAST (MULTI): Primary | ICD-10-CM

## 2024-05-10 PROCEDURE — 1160F RVW MEDS BY RX/DR IN RCRD: CPT | Performed by: NURSE PRACTITIONER

## 2024-05-10 PROCEDURE — 1126F AMNT PAIN NOTED NONE PRSNT: CPT | Performed by: NURSE PRACTITIONER

## 2024-05-10 PROCEDURE — 99214 OFFICE O/P EST MOD 30 MIN: CPT | Performed by: NURSE PRACTITIONER

## 2024-05-10 PROCEDURE — 1159F MED LIST DOCD IN RCRD: CPT | Performed by: NURSE PRACTITIONER

## 2024-05-10 PROCEDURE — 3078F DIAST BP <80 MM HG: CPT | Performed by: NURSE PRACTITIONER

## 2024-05-10 PROCEDURE — 3074F SYST BP LT 130 MM HG: CPT | Performed by: NURSE PRACTITIONER

## 2024-05-10 PROCEDURE — 99204 OFFICE O/P NEW MOD 45 MIN: CPT | Performed by: NURSE PRACTITIONER

## 2024-05-10 ASSESSMENT — PAIN SCALES - GENERAL: PAINLEVEL: 0-NO PAIN

## 2024-06-06 ENCOUNTER — OFFICE VISIT (OUTPATIENT)
Dept: PRIMARY CARE | Facility: CLINIC | Age: 79
End: 2024-06-06
Payer: MEDICARE

## 2024-06-06 VITALS
DIASTOLIC BLOOD PRESSURE: 74 MMHG | OXYGEN SATURATION: 99 % | HEIGHT: 61 IN | HEART RATE: 65 BPM | WEIGHT: 130 LBS | BODY MASS INDEX: 24.55 KG/M2 | TEMPERATURE: 97.4 F | SYSTOLIC BLOOD PRESSURE: 122 MMHG

## 2024-06-06 DIAGNOSIS — Z00.00 ROUTINE GENERAL MEDICAL EXAMINATION AT HEALTH CARE FACILITY: Primary | ICD-10-CM

## 2024-06-06 DIAGNOSIS — Z00.00 ROUTINE ADULT HEALTH MAINTENANCE: ICD-10-CM

## 2024-06-06 DIAGNOSIS — Z00.00 MEDICARE ANNUAL WELLNESS VISIT, SUBSEQUENT: ICD-10-CM

## 2024-06-06 DIAGNOSIS — M19.91 PRIMARY OSTEOARTHRITIS, UNSPECIFIED SITE: ICD-10-CM

## 2024-06-06 DIAGNOSIS — D05.11 DUCTAL CARCINOMA IN SITU (DCIS) OF RIGHT BREAST: ICD-10-CM

## 2024-06-06 DIAGNOSIS — M79.7 FIBROMYALGIA: ICD-10-CM

## 2024-06-06 PROCEDURE — G0444 DEPRESSION SCREEN ANNUAL: HCPCS | Performed by: FAMILY MEDICINE

## 2024-06-06 PROCEDURE — 1160F RVW MEDS BY RX/DR IN RCRD: CPT | Performed by: FAMILY MEDICINE

## 2024-06-06 PROCEDURE — 3074F SYST BP LT 130 MM HG: CPT | Performed by: FAMILY MEDICINE

## 2024-06-06 PROCEDURE — 1158F ADVNC CARE PLAN TLK DOCD: CPT | Performed by: FAMILY MEDICINE

## 2024-06-06 PROCEDURE — 1159F MED LIST DOCD IN RCRD: CPT | Performed by: FAMILY MEDICINE

## 2024-06-06 PROCEDURE — 1123F ACP DISCUSS/DSCN MKR DOCD: CPT | Performed by: FAMILY MEDICINE

## 2024-06-06 PROCEDURE — 1170F FXNL STATUS ASSESSED: CPT | Performed by: FAMILY MEDICINE

## 2024-06-06 PROCEDURE — G0439 PPPS, SUBSEQ VISIT: HCPCS | Performed by: FAMILY MEDICINE

## 2024-06-06 PROCEDURE — 3078F DIAST BP <80 MM HG: CPT | Performed by: FAMILY MEDICINE

## 2024-06-06 PROCEDURE — 1036F TOBACCO NON-USER: CPT | Performed by: FAMILY MEDICINE

## 2024-06-06 PROCEDURE — 99397 PER PM REEVAL EST PAT 65+ YR: CPT | Performed by: FAMILY MEDICINE

## 2024-06-06 PROCEDURE — 99214 OFFICE O/P EST MOD 30 MIN: CPT | Performed by: FAMILY MEDICINE

## 2024-06-06 RX ORDER — TRAMADOL HYDROCHLORIDE 50 MG/1
50 TABLET ORAL EVERY 8 HOURS PRN
Qty: 90 TABLET | Refills: 0 | Status: SHIPPED | OUTPATIENT
Start: 2024-06-06

## 2024-06-06 RX ORDER — TRAMADOL HYDROCHLORIDE 50 MG/1
50 TABLET ORAL EVERY 8 HOURS PRN
Qty: 90 TABLET | Refills: 0 | Status: SHIPPED | OUTPATIENT
Start: 2024-07-20

## 2024-06-06 ASSESSMENT — ACTIVITIES OF DAILY LIVING (ADL)
DOING_HOUSEWORK: INDEPENDENT
GROCERY_SHOPPING: INDEPENDENT
MANAGING_FINANCES: INDEPENDENT
DRESSING: INDEPENDENT
BATHING: INDEPENDENT
TAKING_MEDICATION: INDEPENDENT

## 2024-06-06 ASSESSMENT — PATIENT HEALTH QUESTIONNAIRE - PHQ9
SUM OF ALL RESPONSES TO PHQ9 QUESTIONS 1 AND 2: 0
1. LITTLE INTEREST OR PLEASURE IN DOING THINGS: NOT AT ALL
2. FEELING DOWN, DEPRESSED OR HOPELESS: NOT AT ALL

## 2024-06-06 NOTE — PROGRESS NOTES
Subjective   Patient ID: Lynn Oneal is a 78 y.o. female who presents for Hypertension, Prediabetes (recheck), and Medicare Annual Wellness Visit Subsequent.  HPI  HPI: Annual Wellness visit. The patient has not felt down over the past 6 weeks. No limitation of movement. Reports no falls. The home has functioning smoke alarms, handrails on the stairs and rugs in the hallway. The home does not have grab bars in the bathroom or poor lighting. Denies hearing change in the ears bilaterally. No diet restrictions.     HTN: well controlled at home.     Ventral hernia: occasionally protrudes.    Breast CA: does not want aggressive treatment.    Preparing meals - independent  Using telephone - independent  Bladder - continent  Bowel - continent    Recent hospital stays - no hospitalizations    ADLs - able to dress, walk and bath independently  Instrumental ADL's - able to shop, maintain finances, managing medications, housekeeping independently    Depression: PHQ-2 - negative    Opioid use -   none  Exercise -  mild  Diet - well balanced  Pain score - none    Providers  none    MiniCOG - 5/5    Education - educated pt on healthy eating, exercise, weight loss    Falls - no falls    Counseled pt on living will: encouraged advanced directive    CV screening: negative    Colonoscopy: last was negative    Diabetes screening: neg    Glaucoma screen: sees ophtho    CT chest tob screen:    Mammo: not eligible    DEXA: due next year    PAP/pelvis: NA    Vaccines:  fully vaccinated,     Patient Active Problem List   Diagnosis    Chronic kidney disease    Essential hypertension    Fibromyalgia    Impaired fasting glucose    Prediabetes    Gastroesophageal reflux disease with esophagitis    Vitamin D deficiency    Stage 3a chronic kidney disease (Multi)    Ductal carcinoma in situ (DCIS) of right breast       Social Connections: Not on file       Current Outpatient Medications on File Prior to Visit   Medication Sig Dispense Refill     cholecalciferol (Vitamin D-3) 25 MCG (1000 UT) capsule Take by mouth.      docusate sodium (Colace) 100 mg capsule Take 1 capsule (100 mg) by mouth 2 times a day. 180 capsule 1    famotidine (Pepcid) 40 mg tablet Take 1 tablet (40 mg) by mouth 2 times a day. 180 tablet 1    lisinopril 10 mg tablet TAKE 1 TABLET BY MOUTH EVERY DAY 90 tablet 3    meclizine (Antivert) 25 mg tablet Take 1 tablet (25 mg) by mouth 3 times a day as needed for dizziness. 30 tablet 2    [DISCONTINUED] traMADol (Ultram) 50 mg tablet Take 1 tablet (50 mg) by mouth every 8 hours if needed for severe pain (7 - 10). 90 tablet 0     No current facility-administered medications on file prior to visit.        Vitals:    06/06/24 1514   BP: 122/74   Pulse: 65   Temp: 36.3 °C (97.4 °F)   SpO2: 99%     Vitals:    06/06/24 1514   Weight: 59 kg (130 lb)       Review of Systems   All other systems reviewed and are negative.      Objective     Physical Exam  Vitals reviewed.   Constitutional:       General: She is not in acute distress.     Appearance: Normal appearance. She is well-developed. She is not diaphoretic.   HENT:      Head: Normocephalic and atraumatic.      Right Ear: Tympanic membrane normal.      Left Ear: Tympanic membrane normal.      Nose: Nose normal.      Mouth/Throat:      Mouth: Mucous membranes are moist.   Eyes:      Pupils: Pupils are equal, round, and reactive to light.   Cardiovascular:      Rate and Rhythm: Normal rate and regular rhythm.      Heart sounds: Normal heart sounds. No murmur heard.     No friction rub. No gallop.   Pulmonary:      Effort: Pulmonary effort is normal.      Breath sounds: Normal breath sounds. No rales.   Abdominal:      General: Bowel sounds are normal.      Palpations: Abdomen is soft. There is mass.      Tenderness: There is no abdominal tenderness.   Musculoskeletal:      Cervical back: Normal range of motion and neck supple.   Skin:     General: Skin is warm and dry.   Neurological:      Mental  Status: She is alert.   Psychiatric:         Mood and Affect: Mood normal.         No visits with results within 2 Month(s) from this visit.   Latest known visit with results is:   Admission on 01/31/2024, Discharged on 01/31/2024   Component Date Value Ref Range Status    Ventricular Rate 01/31/2024 84  BPM Final    Atrial Rate 01/31/2024 84  BPM Final    [place] Interval 01/31/2024 155  ms Final    QRS Duration 01/31/2024 99  ms Final    QT Interval 01/31/2024 381  ms Final    QTC Calculation(Bazett) 01/31/2024 451  ms Final    P Axis 01/31/2024 50  degrees Final    R Axis 01/31/2024 -21  degrees Final    T Axis 01/31/2024 -4  degrees Final    QRS Count 01/31/2024 13  beats Final    Q Onset 01/31/2024 251  ms Final    T Offset 01/31/2024 441  ms Final    QTC Fredericia 01/31/2024 426  ms Final    Case Report 01/31/2024    Final                    Value:Surgical Pathology                                Case: [id number]                                  Authorizing Provider:  Yasmine Melara MD        Collected:           01/31/2024 1122              Ordering Location:     [facility]  Received:            01/31/2024 1245                                     OR                                                                           Pathologist:           Guadalupe Palmer MD                                                           Specimens:   A) - SENTINEL LYMPH NODE BREAST RIGHT, SENTINAL LYMPH NODE 1                                        B) - BREAST LUMPECTOMY RIGHT, RIGHT BREAST LUMPECTOMY- SHORT STITCH SUPERIOR- LONG                  STITCH LATERAL, WITH DEEP MARGIN TISSUE                                                    FINAL DIAGNOSIS 01/31/2024    Final                    Value:This result contains rich text formatting which cannot be displayed here.      01/31/2024    Final                    Value:This result contains rich text formatting which cannot be displayed here.    Case Summary Report  "01/31/2024    Final                    Value:DCIS OF THE BREAST: Resection                            DCIS OF THE BREAST: RESECTION - All Specimens                            8th Edition - Protocol posted: 3/23/2022                                                        SPECIMEN                               Procedure:    Excision (less than total mastectomy)                                Specimen Laterality:    Right                                                         TUMOR                               Tumor Site:    Not specified                                Histologic Type:    Ductal carcinoma in situ                                Size (Extent) of DCIS:    Estimated size (extent) of DCIS is at least (Millimeters): as measured on prior biopsy ([id number]): 4 mm                               Architectural Patterns:    Cribriform                                Architectural Patterns:    Micropapillary                                Architectural Patterns:    Papillary                                Nuclear Grade:    Grade II (intermediate)                                Necrosis:    Present, central (expansive \"comedo\" necrosis)                                Microcalcifications:    Present in DCIS                                                         MARGINS                               Margin Status:    Not applicable (no DCIS in specimen)                                                         REGIONAL LYMPH NODES                               Regional Lymph Node Status:                                     :    All regional lymph nodes negative for tumor                                  Total Number of Lymph Nodes Examined (sentinel and non-sentinel):    1                                  Number of [place] Nodes Examined:    1                                                         PATHOLOGIC STAGE CLASSIFICATION (pTNM, AJCC 8th Edition)                               Reporting of pT, pN, and (when " applicable) pM categories is based on information available to the pathologist at the time the report is issued. As per the AJCC (Chapter 1, 8th Ed.) it is the managing physician’s responsibility to establish the final pathologic stage based upon all pertinent information, including but potentially not limited to this pathology report.                               pT Category:    pTis (DCIS)                                Regional Lymph Nodes Modifier:    (sn): [place] node(s) evaluated                                pN Category:    pN0                                                         ADDITIONAL FINDINGS                               Additional Findings:    Atypical ductal and lobular hyperplasia, flat epithelial atypia,Usual ductal hyperplasia, pseudoangiomatous stromal hyperplasia, columnar cell changes, intraductal papillomas, microcysts and apocrine metaplasia                                                            Breast Biomarker Testing Performed on Previous Biopsy:                                     Estrogen Receptor (ER) Status:    Positive                                    Percentage of Cells with Nuclear Positivity:    >95 %                                 Testing Performed on Case Number:    [id number] A4                                                            Comment(s):    Tumor block: [id number] A4       Clinical History 01/31/2024    Final                    Value:This result contains rich text formatting which cannot be displayed here.    Gross Description 01/31/2024    Final                    Value:This result contains rich text formatting which cannot be displayed here.       Assessment/Plan   Problem List Items Addressed This Visit       Fibromyalgia    Relevant Medications    traMADol (Ultram) 50 mg tablet    traMADol (Ultram) 50 mg tablet (Start on 7/20/2024)    Ductal carcinoma in situ (DCIS) of right breast     Other Visit Diagnoses       Routine general medical examination  at health care facility    -  Primary    Primary osteoarthritis, unspecified site        Relevant Medications    traMADol (Ultram) 50 mg tablet    traMADol (Ultram) 50 mg tablet (Start on 7/20/2024)    Medicare annual wellness visit, subsequent        Routine adult health maintenance            .    Doing well.  Refilled pts meds.  Fu in 3 mo.

## 2024-07-30 RX ORDER — TRAMADOL HYDROCHLORIDE 50 MG/1
50 TABLET ORAL EVERY 8 HOURS PRN
Qty: 90 TABLET | Refills: 0 | OUTPATIENT
Start: 2024-07-30

## 2024-07-31 ENCOUNTER — TELEPHONE (OUTPATIENT)
Dept: RADIOLOGY | Facility: HOSPITAL | Age: 79
End: 2024-07-31
Payer: MEDICARE

## 2024-07-31 NOTE — TELEPHONE ENCOUNTER
This RN Navigator contacted patient for outreach to identify barriers, needs, and offer support. Reintroduced self and role. Patient states she has recovered well from surgery and discussed her care during treatment. Provided therapeutic listening and support. Reminder to patient regarding scheduling her next mammogram in November provided, patient states understanding and accepted # for scheduling with correct read back in order to schedule at her convenience. Education regarding available local/virtual support and information reviewed. Patient denies further needs at this time. Encouraged patient to contact this RN Navigator as needed.

## 2024-08-01 RX ORDER — TRAMADOL HYDROCHLORIDE 50 MG/1
50 TABLET ORAL EVERY 8 HOURS PRN
Qty: 90 TABLET | Refills: 0 | OUTPATIENT
Start: 2024-08-01

## 2024-08-01 NOTE — TELEPHONE ENCOUNTER
Pt called again asking if her rx can be sent asap as she is out, she is aware her pcp is out of office.

## 2024-08-01 NOTE — TELEPHONE ENCOUNTER
Pt tramadol has been out since yesterday, pt requesting refill but pharmacy needs okay from PCP to fill.    Tramadol - [facility]

## 2024-08-05 DIAGNOSIS — M79.7 FIBROMYALGIA: ICD-10-CM

## 2024-08-05 DIAGNOSIS — M19.91 PRIMARY OSTEOARTHRITIS, UNSPECIFIED SITE: ICD-10-CM

## 2024-08-05 RX ORDER — TRAMADOL HYDROCHLORIDE 50 MG/1
50 TABLET ORAL EVERY 8 HOURS PRN
Qty: 90 TABLET | Refills: 0 | Status: SHIPPED | OUTPATIENT
Start: 2024-08-05

## 2024-08-05 NOTE — TELEPHONE ENCOUNTER
Pt states she gave phramacy both printed scripts at once as directed by C... pharmacy ruffin not have refill for pt. Pt has been without rx for 4 days. Requesting refill.    Tr[facility]adol - [facility]

## 2024-08-05 NOTE — TELEPHONE ENCOUNTER
Pt states pharmacy does not have her refill on Tramadol and she has been out of meds x5 days.  Please advise on refill? Thanks, CG

## 2024-09-04 DIAGNOSIS — K21.00 GASTROESOPHAGEAL REFLUX DISEASE WITH ESOPHAGITIS WITHOUT HEMORRHAGE: ICD-10-CM

## 2024-09-04 RX ORDER — FAMOTIDINE 40 MG/1
40 TABLET, FILM COATED ORAL 2 TIMES DAILY
Qty: 180 TABLET | Refills: 1 | Status: SHIPPED | OUTPATIENT
Start: 2024-09-04

## 2024-09-05 ENCOUNTER — APPOINTMENT (OUTPATIENT)
Dept: PRIMARY CARE | Facility: CLINIC | Age: 79
End: 2024-09-05
Payer: MEDICARE

## 2024-09-05 VITALS
TEMPERATURE: 97.2 F | BODY MASS INDEX: 24.01 KG/M2 | WEIGHT: 125 LBS | OXYGEN SATURATION: 94 % | SYSTOLIC BLOOD PRESSURE: 130 MMHG | HEART RATE: 58 BPM | DIASTOLIC BLOOD PRESSURE: 74 MMHG

## 2024-09-05 DIAGNOSIS — M79.7 FIBROMYALGIA: ICD-10-CM

## 2024-09-05 DIAGNOSIS — M19.91 PRIMARY OSTEOARTHRITIS, UNSPECIFIED SITE: ICD-10-CM

## 2024-09-05 DIAGNOSIS — N18.31 STAGE 3A CHRONIC KIDNEY DISEASE (MULTI): ICD-10-CM

## 2024-09-05 DIAGNOSIS — I10 PRIMARY HYPERTENSION: ICD-10-CM

## 2024-09-05 PROCEDURE — 99214 OFFICE O/P EST MOD 30 MIN: CPT | Performed by: FAMILY MEDICINE

## 2024-09-05 PROCEDURE — 1159F MED LIST DOCD IN RCRD: CPT | Performed by: FAMILY MEDICINE

## 2024-09-05 PROCEDURE — 3075F SYST BP GE 130 - 139MM HG: CPT | Performed by: FAMILY MEDICINE

## 2024-09-05 PROCEDURE — 1036F TOBACCO NON-USER: CPT | Performed by: FAMILY MEDICINE

## 2024-09-05 PROCEDURE — 1123F ACP DISCUSS/DSCN MKR DOCD: CPT | Performed by: FAMILY MEDICINE

## 2024-09-05 PROCEDURE — 3078F DIAST BP <80 MM HG: CPT | Performed by: FAMILY MEDICINE

## 2024-09-05 RX ORDER — LISINOPRIL 10 MG/1
10 TABLET ORAL DAILY
Qty: 90 TABLET | Refills: 3 | Status: SHIPPED | OUTPATIENT
Start: 2024-09-05

## 2024-09-05 RX ORDER — TRAMADOL HYDROCHLORIDE 50 MG/1
50 TABLET ORAL EVERY 8 HOURS PRN
Qty: 90 TABLET | Refills: 1 | Status: SHIPPED | OUTPATIENT
Start: 2024-09-05

## 2024-09-05 ASSESSMENT — ENCOUNTER SYMPTOMS: HYPERTENSION: 1

## 2024-09-05 NOTE — PROGRESS NOTES
cSubjective   Patient ID: Lynn Oneal is a 79 y.o. female who presents for Hypertension.  Hypertension      HTN: well controlled  Fibromyalgia: helped by tramadol which she takes rarely.  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  Conservative management.  GERD: stable  Vertigo: taking meclizine prn    Patient Active Problem List   Diagnosis    Chronic kidney disease    Essential hypertension    Fibromyalgia    Impaired fasting glucose    Prediabetes    Gastroesophageal reflux disease with esophagitis    Vitamin D deficiency    Stage 3a chronic kidney disease (Multi)    Ductal carcinoma in situ (DCIS) of right breast       Social Connections: Not on file       Current Outpatient Medications on File Prior to Visit   Medication Sig Dispense Refill    cholecalciferol (Vitamin D-3) 25 MCG (1000 UT) capsule Take by mouth.      docusate sodium (Colace) 100 mg capsule Take 1 capsule (100 mg) by mouth 2 times a day. 180 capsule 1    famotidine (Pepcid) 40 mg tablet TAKE 1 TABLET BY MOUTH 2 TIMES A  tablet 1    meclizine (Antivert) 25 mg tablet Take 1 tablet (25 mg) by mouth 3 times a day as needed for dizziness. 30 tablet 2    traMADol (Ultram) 50 mg tablet Take 1 tablet (50 mg) by mouth every 8 hours if needed for severe pain (7 - 10). 90 tablet 0    [DISCONTINUED] lisinopril 10 mg tablet TAKE 1 TABLET BY MOUTH EVERY DAY 90 tablet 3    [DISCONTINUED] traMADol (Ultram) 50 mg tablet Take 1 tablet (50 mg) by mouth every 8 hours if needed for severe pain (7 - 10). 90 tablet 0    [DISCONTINUED] famotidine (Pepcid) 40 mg tablet Take 1 tablet (40 mg) by mouth 2 times a day. 180 tablet 1     No current facility-administered medications on file prior to visit.        Vitals:    09/05/24 1500   BP: 130/74   Pulse: 58   Temp: 36.2 °C (97.2 °F)   SpO2: 94%     Vitals:    09/05/24 1500   Weight: 56.7 kg (125 lb)       Review of Systems   All other systems reviewed and are negative.      Objective     Physical  Exam  Constitutional:       Appearance: Normal appearance. She is well-developed.   HENT:      Head: Atraumatic.   Cardiovascular:      Rate and Rhythm: Normal rate and regular rhythm.      Heart sounds: Normal heart sounds. No murmur heard.  Pulmonary:      Effort: Pulmonary effort is normal.      Breath sounds: Normal breath sounds.   Abdominal:      General: Bowel sounds are normal.      Palpations: Abdomen is soft.   Skin:     General: Skin is warm.   Neurological:      General: No focal deficit present.      Mental Status: She is alert.   Psychiatric:         Mood and Affect: Mood normal.         No visits with results within 2 Month(s) from this visit.   Latest known visit with results is:   Admission on 01/31/2024, Discharged on 01/31/2024   Component Date Value Ref Range Status    Ventricular Rate 01/31/2024 84  BPM Final    Atrial Rate 01/31/2024 84  BPM Final    CO Interval 01/31/2024 155  ms Final    QRS Duration 01/31/2024 99  ms Final    QT Interval 01/31/2024 381  ms Final    QTC Calculation(Bazett) 01/31/2024 451  ms Final    P Axis 01/31/2024 50  degrees Final    R Axis 01/31/2024 -21  degrees Final    T Axis 01/31/2024 -4  degrees Final    QRS Count 01/31/2024 13  beats Final    Q Onset 01/31/2024 251  ms Final    T Offset 01/31/2024 441  ms Final    QTC Fredericia 01/31/2024 426  ms Final    Case Report 01/31/2024    Final                    Value:Surgical Pathology                                Case: [id number]                                  Authorizing Provider:  Yasmine Melara MD        Collected:           01/31/2024 1122              Ordering Location:     [facility]  Received:            01/31/2024 1245                                     OR                                                                           Pathologist:           Guadalupe Palmer MD                                                           Specimens:   A) - SENTINEL LYMPH NODE BREAST RIGHT, SENTINAL  LYMPH NODE 1                                        B) - BREAST LUMPECTOMY RIGHT, RIGHT BREAST LUMPECTOMY- SHORT STITCH SUPERIOR- LONG                  STITCH LATERAL, WITH DEEP MARGIN TISSUE                                                    FINAL DIAGNOSIS 01/31/2024    Final                    Value:A. [place] lymph node 1, excision:                          -- One lymph node, negative for carcinoma (0/1) , see note                                                    Note: Multiple deeper levels were reviewed.                                                    B. Right breast, lumpectomy:                          -- No residual carcinoma identified, see synoptic report and note.                          -- Focal atypical ductal hyperplasia.                          -- Flat epithelial atypia.                          -- Focal atypical lobular hyperplasia.                          -- Previous biopsy site changes.                                                    Note: The previous biopsy ([id number]) has been reviewed and the diagnosis                           of ductal carcinoma in situ is re-affirmed. Staging will incorporate                           findings from the current lumpectomy specimen as well as findings from the                           previous biopsy ([id number]).                                                    : Dr. Johnson (selected slides)                                                                                    01/31/2024    Final                    Value:By the signature on this report, the individual or group listed as making                           the Final Interpretation/Diagnosis certifies that they have reviewed this                           case.     Case Summary Report 01/31/2024    Final                    Value:DCIS OF THE BREAST: Resection                            DCIS OF THE BREAST: RESECTION - All Specimens                            8th  "Edition - Protocol posted: 3/23/2022                                                        SPECIMEN                               Procedure:    Excision (less than total mastectomy)                                Specimen Laterality:    Right                                                         TUMOR                               Tumor Site:    Not specified                                Histologic Type:    Ductal carcinoma in situ                                Size (Extent) of DCIS:    Estimated size (extent) of DCIS is at least (Millimeters): as measured on prior biopsy ([id number]): 4 mm                               Architectural Patterns:    Cribriform                                Architectural Patterns:    Micropapillary                                Architectural Patterns:    Papillary                                Nuclear Grade:    Grade II (intermediate)                                Necrosis:    Present, central (expansive \"comedo\" necrosis)                                Microcalcifications:    Present in DCIS                                                         MARGINS                               Margin Status:    Not applicable (no DCIS in specimen)                                                         REGIONAL LYMPH NODES                               Regional Lymph Node Status:                                     :    All regional lymph nodes negative for tumor                                  Total Number of Lymph Nodes Examined (sentinel and non-sentinel):    1                                  Number of [place] Nodes Examined:    1                                                         PATHOLOGIC STAGE CLASSIFICATION (pTNM, AJCC 8th Edition)                               Reporting of pT, pN, and (when applicable) pM categories is based on information available to the pathologist at the time the report is issued. As per the AJCC (Chapter 1, 8th Ed.) it is the managing physician’s " "responsibility to establish the final pathologic stage based upon all pertinent information, including but potentially not limited to this pathology report.                               pT Category:    pTis (DCIS)                                Regional Lymph Nodes Modifier:    (sn): [place] node(s) evaluated                                pN Category:    pN0                                                         ADDITIONAL FINDINGS                               Additional Findings:    Atypical ductal and lobular hyperplasia, flat epithelial atypia,Usual ductal hyperplasia, pseudoangiomatous stromal hyperplasia, columnar cell changes, intraductal papillomas, microcysts and apocrine metaplasia                                                            Breast Biomarker Testing Performed on Previous Biopsy:                                     Estrogen Receptor (ER) Status:    Positive                                    Percentage of Cells with Nuclear Positivity:    >95 %                                 Testing Performed on Case Number:    [id number] A4                                                            Comment(s):    Tumor block: [id number] A4       Clinical History 01/31/2024    Final                    Value:Pre-op diagnosis:                          DCIS (ductal carcinoma in situ) [D05.10]    Gross Description 01/31/2024    Final                    Value:A: Received in formalin, labeled with the patient's name and hospital                           number and \" sentinel lymph node 1\", is a 1.7 x 1.4 x 0.7 cm possible                           lymph node with attached adipose tissue.  The specimen is serially                           sectioned and submitted entirely in 2 cassettes.                          MJ                          B: Received in formalin, labeled with the patient's name and hospital                           number and \" right breast lumpectomy-short stitch superior-long stitch        " "                   lateral with deep margin tissue margin\", is an irregular segment of yellow                           lobulated fibrofatty tissue, 5.3 cm medial to lateral, 3.6 cm superior to                           inferior and 1.3 cm anterior to posterior. A skin ellipse is not present.                           The specimen is oriented with a short superior and a long lateral stitch.                           A localizing needle is not identified. The specimen is inked in the                           following manner: anterior green, posterior black, superior violet,                           inferior blue, medial yellow, and lateral orange.   The specimen is                           serially sectioned from  medial to lateral.  Cross sections reveal a 1.1 x                           0.7 x 0.5 cm yellow-white ill-defined lesion in slices 3-5.  The lesion is                           0.1 cm from the anterior margin, 0.5 cm from the posterior margin, 0.8 cm                           from the superior margin, 1.1 cm from the inferior margin, 0.9 cm from the                           medial margin and 2.0 cm from the lateral margin.  A Magseed is identified                           in slice 4 and a stoplight biopsy clip is identified in slice 5 within the                           lesion.  The remaining cut surfaces consists of yellow lobulated adipose                           tissue interspersed with tan-white fibrous tissue occupying approximately                           10% of the cut surfaces.  No additional lesions are gross identified.                            Also received in the same container are multiple irregular fragments of                           fibroadipose tissue aggregating to 3.5 x 3.0 x 0.6 cm.  These fragments                           are entirely inked black and serially sectioned.  A photograph has been                           taken. The specimen is submitted entirely in 15 " cassettes.                                                                              NOTE:  Ischemia time: 1/31/2024 11:44.                          This specimen was placed into formalin at: Not provided.                                                     Summary of Cassettes:                          Specimen Label Site                           B  1 medial margin, shave                            2 slice 2, parenchyma adjacent to lesion                            3-4 slice 3 with lesion, bisected to include lesion to closest superior                           margin                            5-6 slice 4 with lesion, bisected with lesion to closest anterior and                           posterior margins                             Magseed identified                            7-8 slice 5 with lesion, bisected to include lesion to closest inferior                            9 slice 6 parenchyma adjacent to lesion                            10 Slice 7                            11 Slice 8                            12 lateral margin, shave                            13-15 separately received fragments of adipose tissue, serially                           sectioned       Assessment/Plan   Problem List Items Addressed This Visit             ICD-10-CM    Fibromyalgia M79.7    Relevant Medications    traMADol (Ultram) 50 mg tablet    Stage 3a chronic kidney disease (Multi) N18.31     Other Visit Diagnoses         Codes    Primary osteoarthritis, unspecified site     M19.91    Relevant Medications    traMADol (Ultram) 50 mg tablet    Primary hypertension     I10    Relevant Medications    lisinopril 10 mg tablet          Refilled pts meds.   I personally reviewed the OARRS report for this patient. I have considered the risks of abuse, dependence, addiction, and diversion. I believe that it is clinically appropriate for this patient to be prescribed this medication based on documented diagnosis.      Fu in 3 mo

## 2024-10-25 ENCOUNTER — TELEPHONE (OUTPATIENT)
Dept: PRIMARY CARE | Facility: CLINIC | Age: 79
End: 2024-10-25
Payer: MEDICARE

## 2024-10-25 DIAGNOSIS — M79.7 FIBROMYALGIA: ICD-10-CM

## 2024-10-25 DIAGNOSIS — M19.91 PRIMARY OSTEOARTHRITIS, UNSPECIFIED SITE: ICD-10-CM

## 2024-10-25 RX ORDER — TRAMADOL HYDROCHLORIDE 50 MG/1
50 TABLET ORAL EVERY 8 HOURS PRN
Qty: 90 TABLET | Refills: 0 | Status: SHIPPED | OUTPATIENT
Start: 2024-10-25 | End: 2024-11-24

## 2024-10-25 NOTE — TELEPHONE ENCOUNTER
Patient needs her   traMADol (Ultram) 50 mg tablet  is asking if another doctor will send it since C is out of the office. DUSTIN Luciooro

## 2024-11-12 ENCOUNTER — HOSPITAL ENCOUNTER (OUTPATIENT)
Dept: RADIOLOGY | Facility: HOSPITAL | Age: 79
Discharge: HOME | End: 2024-11-12
Payer: MEDICARE

## 2024-11-12 VITALS — BODY MASS INDEX: 24.39 KG/M2 | HEIGHT: 59 IN | WEIGHT: 121 LBS

## 2024-11-12 DIAGNOSIS — D05.11 DUCTAL CARCINOMA IN SITU (DCIS) OF RIGHT BREAST: ICD-10-CM

## 2024-11-12 PROCEDURE — G0279 TOMOSYNTHESIS, MAMMO: HCPCS

## 2024-11-12 PROCEDURE — 77062 BREAST TOMOSYNTHESIS BI: CPT

## 2024-11-12 PROCEDURE — 77066 DX MAMMO INCL CAD BI: CPT

## 2024-12-09 ENCOUNTER — APPOINTMENT (OUTPATIENT)
Dept: PRIMARY CARE | Facility: CLINIC | Age: 79
End: 2024-12-09
Payer: MEDICARE

## 2024-12-09 ENCOUNTER — LAB (OUTPATIENT)
Dept: LAB | Facility: LAB | Age: 79
End: 2024-12-09
Payer: MEDICARE

## 2024-12-09 VITALS
SYSTOLIC BLOOD PRESSURE: 122 MMHG | BODY MASS INDEX: 25.25 KG/M2 | WEIGHT: 125 LBS | DIASTOLIC BLOOD PRESSURE: 66 MMHG | OXYGEN SATURATION: 96 % | TEMPERATURE: 97.4 F | HEART RATE: 82 BPM

## 2024-12-09 DIAGNOSIS — I10 PRIMARY HYPERTENSION: ICD-10-CM

## 2024-12-09 DIAGNOSIS — M79.7 FIBROMYALGIA: ICD-10-CM

## 2024-12-09 DIAGNOSIS — R42 VERTIGO: ICD-10-CM

## 2024-12-09 DIAGNOSIS — M19.91 PRIMARY OSTEOARTHRITIS, UNSPECIFIED SITE: ICD-10-CM

## 2024-12-09 DIAGNOSIS — F41.9 ANXIETY: Primary | ICD-10-CM

## 2024-12-09 LAB
ALBUMIN SERPL BCP-MCNC: 4.2 G/DL (ref 3.4–5)
ALP SERPL-CCNC: 57 U/L (ref 33–136)
ALT SERPL W P-5'-P-CCNC: 6 U/L (ref 7–45)
ANION GAP SERPL CALC-SCNC: 11 MMOL/L (ref 10–20)
AST SERPL W P-5'-P-CCNC: 10 U/L (ref 9–39)
BASOPHILS # BLD AUTO: 0.04 X10*3/UL (ref 0–0.1)
BASOPHILS NFR BLD AUTO: 0.5 %
BILIRUB SERPL-MCNC: 0.3 MG/DL (ref 0–1.2)
BUN SERPL-MCNC: 18 MG/DL (ref 6–23)
CALCIUM SERPL-MCNC: 9.6 MG/DL (ref 8.6–10.3)
CHLORIDE SERPL-SCNC: 102 MMOL/L (ref 98–107)
CO2 SERPL-SCNC: 29 MMOL/L (ref 21–32)
CREAT SERPL-MCNC: 1.18 MG/DL (ref 0.5–1.05)
EGFRCR SERPLBLD CKD-EPI 2021: 47 ML/MIN/1.73M*2
EOSINOPHIL # BLD AUTO: 0.07 X10*3/UL (ref 0–0.4)
EOSINOPHIL NFR BLD AUTO: 0.8 %
ERYTHROCYTE [DISTWIDTH] IN BLOOD BY AUTOMATED COUNT: 13 % (ref 11.5–14.5)
GLUCOSE SERPL-MCNC: 94 MG/DL (ref 74–99)
HCT VFR BLD AUTO: 43.6 % (ref 36–46)
HGB BLD-MCNC: 13.8 G/DL (ref 12–16)
IMM GRANULOCYTES # BLD AUTO: 0.02 X10*3/UL (ref 0–0.5)
IMM GRANULOCYTES NFR BLD AUTO: 0.2 % (ref 0–0.9)
LYMPHOCYTES # BLD AUTO: 1.79 X10*3/UL (ref 0.8–3)
LYMPHOCYTES NFR BLD AUTO: 20.2 %
MCH RBC QN AUTO: 27.5 PG (ref 26–34)
MCHC RBC AUTO-ENTMCNC: 31.7 G/DL (ref 32–36)
MCV RBC AUTO: 87 FL (ref 80–100)
MONOCYTES # BLD AUTO: 0.47 X10*3/UL (ref 0.05–0.8)
MONOCYTES NFR BLD AUTO: 5.3 %
NEUTROPHILS # BLD AUTO: 6.46 X10*3/UL (ref 1.6–5.5)
NEUTROPHILS NFR BLD AUTO: 73 %
NRBC BLD-RTO: 0 /100 WBCS (ref 0–0)
PLATELET # BLD AUTO: 348 X10*3/UL (ref 150–450)
POTASSIUM SERPL-SCNC: 4.3 MMOL/L (ref 3.5–5.3)
PROT SERPL-MCNC: 6.8 G/DL (ref 6.4–8.2)
RBC # BLD AUTO: 5.02 X10*6/UL (ref 4–5.2)
SODIUM SERPL-SCNC: 138 MMOL/L (ref 136–145)
WBC # BLD AUTO: 8.9 X10*3/UL (ref 4.4–11.3)

## 2024-12-09 PROCEDURE — 85025 COMPLETE CBC W/AUTO DIFF WBC: CPT

## 2024-12-09 PROCEDURE — G2211 COMPLEX E/M VISIT ADD ON: HCPCS | Performed by: FAMILY MEDICINE

## 2024-12-09 PROCEDURE — 80053 COMPREHEN METABOLIC PANEL: CPT

## 2024-12-09 PROCEDURE — 36415 COLL VENOUS BLD VENIPUNCTURE: CPT

## 2024-12-09 PROCEDURE — 99214 OFFICE O/P EST MOD 30 MIN: CPT | Performed by: FAMILY MEDICINE

## 2024-12-09 PROCEDURE — 3074F SYST BP LT 130 MM HG: CPT | Performed by: FAMILY MEDICINE

## 2024-12-09 PROCEDURE — 1159F MED LIST DOCD IN RCRD: CPT | Performed by: FAMILY MEDICINE

## 2024-12-09 PROCEDURE — 3078F DIAST BP <80 MM HG: CPT | Performed by: FAMILY MEDICINE

## 2024-12-09 PROCEDURE — 1123F ACP DISCUSS/DSCN MKR DOCD: CPT | Performed by: FAMILY MEDICINE

## 2024-12-09 RX ORDER — MECLIZINE HYDROCHLORIDE 25 MG/1
25 TABLET ORAL 3 TIMES DAILY PRN
Qty: 30 TABLET | Refills: 2 | Status: SHIPPED | OUTPATIENT
Start: 2024-12-09

## 2024-12-09 RX ORDER — TRAMADOL HYDROCHLORIDE 50 MG/1
50 TABLET ORAL EVERY 8 HOURS PRN
Qty: 90 TABLET | Refills: 1 | Status: SHIPPED | OUTPATIENT
Start: 2024-12-09

## 2024-12-09 RX ORDER — SERTRALINE HYDROCHLORIDE 25 MG/1
25 TABLET, FILM COATED ORAL DAILY
Qty: 90 TABLET | Refills: 1 | Status: SHIPPED | OUTPATIENT
Start: 2024-12-09 | End: 2025-06-07

## 2024-12-09 RX ORDER — LISINOPRIL 10 MG/1
10 TABLET ORAL DAILY
Qty: 90 TABLET | Refills: 3 | Status: SHIPPED | OUTPATIENT
Start: 2024-12-09

## 2024-12-09 NOTE — PROGRESS NOTES
cSubjective   Patient ID: Lynn Oneal is a 79 y.o. female who presents for Hypertension (Recheck, no blood work).  Hypertension      HTN: well controlled  Fibromyalgia: helped by tramadol which she takes occasionally..  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  Conservative management.  GERD: stable  Vertigo: taking meclizine prn  CRI: mildly decreased GFR.      Patient Active Problem List   Diagnosis    Chronic kidney disease    Essential hypertension    Fibromyalgia    Impaired fasting glucose    Prediabetes    Gastroesophageal reflux disease with esophagitis    Vitamin D deficiency    Stage 3a chronic kidney disease (Multi)    Ductal carcinoma in situ (DCIS) of right breast       Social Connections: Not on file       Current Outpatient Medications on File Prior to Visit   Medication Sig Dispense Refill    cholecalciferol (Vitamin D-3) 25 MCG (1000 UT) capsule Take by mouth.      famotidine (Pepcid) 40 mg tablet TAKE 1 TABLET BY MOUTH 2 TIMES A  tablet 1    [DISCONTINUED] lisinopril 10 mg tablet Take 1 tablet (10 mg) by mouth once daily. 90 tablet 3    [DISCONTINUED] meclizine (Antivert) 25 mg tablet Take 1 tablet (25 mg) by mouth 3 times a day as needed for dizziness. 30 tablet 2    [DISCONTINUED] traMADol (Ultram) 50 mg tablet Take 1 tablet (50 mg) by mouth every 8 hours if needed for severe pain (7 - 10). 90 tablet 1    docusate sodium (Colace) 100 mg capsule Take 1 capsule (100 mg) by mouth 2 times a day. 180 capsule 1     No current facility-administered medications on file prior to visit.        Vitals:    12/09/24 1459   BP: 122/66   Pulse: 82   Temp: 36.3 °C (97.4 °F)   SpO2: 96%     Vitals:    12/09/24 1459   Weight: 56.7 kg (125 lb)       Review of Systems   All other systems reviewed and are negative.      Objective     Physical Exam  Constitutional:       Appearance: Normal appearance. She is well-developed.   HENT:      Head: Atraumatic.   Cardiovascular:      Rate and  Rhythm: Normal rate and regular rhythm.      Heart sounds: Normal heart sounds. No murmur heard.  Pulmonary:      Effort: Pulmonary effort is normal.      Breath sounds: Normal breath sounds.   Abdominal:      General: Bowel sounds are normal.      Palpations: Abdomen is soft.   Skin:     General: Skin is warm.   Neurological:      General: No focal deficit present.      Mental Status: She is alert.   Psychiatric:         Mood and Affect: Mood normal.         Lab on 12/09/2024   Component Date Value Ref Range Status    Glucose 12/09/2024 94  74 - 99 mg/dL Final    Sodium 12/09/2024 138  136 - 145 mmol/L Final    Potassium 12/09/2024 4.3  3.5 - 5.3 mmol/L Final    Chloride 12/09/2024 102  98 - 107 mmol/L Final    Bicarbonate 12/09/2024 29  21 - 32 mmol/L Final    Anion Gap 12/09/2024 11  10 - 20 mmol/L Final    Urea Nitrogen 12/09/2024 18  6 - 23 mg/dL Final    Creatinine 12/09/2024 1.18 (H)  0.50 - 1.05 mg/dL Final    eGFR 12/09/2024 47 (L)  >60 mL/min/1.73m*2 Final    Calculations of estimated GFR are performed using the 2021 CKD-EPI Study Refit equation without the race variable for the IDMS-Traceable creatinine methods.  https://jasn.asnjournals.org/content/early/2021/09/22/ASN.3183780301    Calcium 12/09/2024 9.6  8.6 - 10.3 mg/dL Final    Albumin 12/09/2024 4.2  3.4 - 5.0 g/dL Final    Alkaline Phosphatase 12/09/2024 57  33 - 136 U/L Final    Total Protein 12/09/2024 6.8  6.4 - 8.2 g/dL Final    AST 12/09/2024 10  9 - 39 U/L Final    Bilirubin, Total 12/09/2024 0.3  0.0 - 1.2 mg/dL Final    ALT 12/09/2024 6 (L)  7 - 45 U/L Final    Patients treated with Sulfasalazine may generate falsely decreased results for ALT.    WBC 12/09/2024 8.9  4.4 - 11.3 x10*3/uL Final    nRBC 12/09/2024 0.0  0.0 - 0.0 /100 WBCs Final    RBC 12/09/2024 5.02  4.00 - 5.20 x10*6/uL Final    Hemoglobin 12/09/2024 13.8  12.0 - 16.0 g/dL Final    Hematocrit 12/09/2024 43.6  36.0 - 46.0 % Final    MCV 12/09/2024 87  80 - 100 fL Final    MCH  12/09/2024 27.5  26.0 - 34.0 pg Final    MCHC 12/09/2024 31.7 (L)  32.0 - 36.0 g/dL Final    RDW 12/09/2024 13.0  11.5 - 14.5 % Final    Platelets 12/09/2024 348  150 - 450 x10*3/uL Final    Neutrophils % 12/09/2024 73.0  40.0 - 80.0 % Final    Immature Granulocytes %, Automated 12/09/2024 0.2  0.0 - 0.9 % Final    Immature Granulocyte Count (IG) includes promyelocytes, myelocytes and metamyelocytes but does not include bands. Percent differential counts (%) should be interpreted in the context of the absolute cell counts (cells/UL).    Lymphocytes % 12/09/2024 20.2  13.0 - 44.0 % Final    Monocytes % 12/09/2024 5.3  2.0 - 10.0 % Final    Eosinophils % 12/09/2024 0.8  0.0 - 6.0 % Final    Basophils % 12/09/2024 0.5  0.0 - 2.0 % Final    Neutrophils Absolute 12/09/2024 6.46 (H)  1.60 - 5.50 x10*3/uL Final    Percent differential counts (%) should be interpreted in the context of the absolute cell counts (cells/uL).    Immature Granulocytes Absolute, Au* 12/09/2024 0.02  0.00 - 0.50 x10*3/uL Final    Lymphocytes Absolute 12/09/2024 1.79  0.80 - 3.00 x10*3/uL Final    Monocytes Absolute 12/09/2024 0.47  0.05 - 0.80 x10*3/uL Final    Eosinophils Absolute 12/09/2024 0.07  0.00 - 0.40 x10*3/uL Final    Basophils Absolute 12/09/2024 0.04  0.00 - 0.10 x10*3/uL Final       Assessment/Plan   Problem List Items Addressed This Visit             ICD-10-CM    Fibromyalgia M79.7    Relevant Medications    traMADol (Ultram) 50 mg tablet     Other Visit Diagnoses         Codes    Anxiety    -  Primary F41.9    Relevant Medications    sertraline (Zoloft) 25 mg tablet    Vertigo     R42    Relevant Medications    meclizine (Antivert) 25 mg tablet    Primary hypertension     I10    Relevant Medications    lisinopril 10 mg tablet    Other Relevant Orders    Comprehensive Metabolic Panel (Completed)    CBC and Auto Differential (Completed)    Primary osteoarthritis, unspecified site     M19.91    Relevant Medications    traMADol (Ultram)  50 mg tablet          Refilled pts meds.   I personally reviewed the OARRS report for this patient. I have considered the risks of abuse, dependence, addiction, and diversion. I believe that it is clinically appropriate for this patient to be prescribed this medication based on documented diagnosis.      Fu in 3 mo

## 2024-12-10 ENCOUNTER — TELEPHONE (OUTPATIENT)
Dept: PRIMARY CARE | Facility: CLINIC | Age: 79
End: 2024-12-10
Payer: MEDICARE

## 2024-12-10 ASSESSMENT — ENCOUNTER SYMPTOMS: HYPERTENSION: 1

## 2025-03-12 ENCOUNTER — APPOINTMENT (OUTPATIENT)
Dept: PRIMARY CARE | Facility: CLINIC | Age: 80
End: 2025-03-12
Payer: MEDICARE

## 2025-03-12 VITALS
WEIGHT: 122.58 LBS | DIASTOLIC BLOOD PRESSURE: 58 MMHG | BODY MASS INDEX: 24.76 KG/M2 | TEMPERATURE: 97.3 F | SYSTOLIC BLOOD PRESSURE: 110 MMHG | OXYGEN SATURATION: 97 % | HEART RATE: 84 BPM

## 2025-03-12 DIAGNOSIS — N18.31 STAGE 3A CHRONIC KIDNEY DISEASE (MULTI): ICD-10-CM

## 2025-03-12 DIAGNOSIS — M79.7 FIBROMYALGIA: ICD-10-CM

## 2025-03-12 DIAGNOSIS — R42 VERTIGO: ICD-10-CM

## 2025-03-12 DIAGNOSIS — I10 PRIMARY HYPERTENSION: ICD-10-CM

## 2025-03-12 DIAGNOSIS — R73.01 IFG (IMPAIRED FASTING GLUCOSE): Primary | ICD-10-CM

## 2025-03-12 DIAGNOSIS — M19.91 PRIMARY OSTEOARTHRITIS, UNSPECIFIED SITE: ICD-10-CM

## 2025-03-12 DIAGNOSIS — K21.00 GASTROESOPHAGEAL REFLUX DISEASE WITH ESOPHAGITIS WITHOUT HEMORRHAGE: ICD-10-CM

## 2025-03-12 PROCEDURE — 3074F SYST BP LT 130 MM HG: CPT | Performed by: FAMILY MEDICINE

## 2025-03-12 PROCEDURE — 1159F MED LIST DOCD IN RCRD: CPT | Performed by: FAMILY MEDICINE

## 2025-03-12 PROCEDURE — 99213 OFFICE O/P EST LOW 20 MIN: CPT | Performed by: FAMILY MEDICINE

## 2025-03-12 PROCEDURE — 3078F DIAST BP <80 MM HG: CPT | Performed by: FAMILY MEDICINE

## 2025-03-12 PROCEDURE — 1036F TOBACCO NON-USER: CPT | Performed by: FAMILY MEDICINE

## 2025-03-12 PROCEDURE — 1123F ACP DISCUSS/DSCN MKR DOCD: CPT | Performed by: FAMILY MEDICINE

## 2025-03-12 PROCEDURE — G2211 COMPLEX E/M VISIT ADD ON: HCPCS | Performed by: FAMILY MEDICINE

## 2025-03-12 RX ORDER — TRAMADOL HYDROCHLORIDE 50 MG/1
50 TABLET ORAL EVERY 8 HOURS PRN
Qty: 90 TABLET | Refills: 1 | Status: SHIPPED | OUTPATIENT
Start: 2025-03-12

## 2025-03-12 RX ORDER — MECLIZINE HYDROCHLORIDE 25 MG/1
25 TABLET ORAL 3 TIMES DAILY PRN
Qty: 30 TABLET | Refills: 2 | Status: SHIPPED | OUTPATIENT
Start: 2025-03-12

## 2025-03-12 RX ORDER — FAMOTIDINE 40 MG/1
40 TABLET, FILM COATED ORAL 2 TIMES DAILY
Qty: 180 TABLET | Refills: 1 | Status: SHIPPED | OUTPATIENT
Start: 2025-03-12

## 2025-03-12 ASSESSMENT — ENCOUNTER SYMPTOMS: HYPERTENSION: 1

## 2025-03-12 NOTE — PROGRESS NOTES
cSubjective   Patient ID: Lynn Oneal is a 79 y.o. female who presents for Hypertension (Recheck, no bw ) and Prediabetes.  Hypertension      HTN: well controlled  Fibromyalgia: helped by tramadol which she takes occasionally..  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  Conservative management.  GERD: stable  Vertigo: taking meclizine prn  CRI: mildly decreased GFR.    Anxiety: doesn't like how she felt on the sertraline so she stopped it.    Patient Active Problem List   Diagnosis    Chronic kidney disease    Essential hypertension    Fibromyalgia    Impaired fasting glucose    Prediabetes    Gastroesophageal reflux disease with esophagitis    Vitamin D deficiency    Stage 3a chronic kidney disease (Multi)    Ductal carcinoma in situ (DCIS) of right breast       Social Connections: Not on file       Current Outpatient Medications on File Prior to Visit   Medication Sig Dispense Refill    cholecalciferol (Vitamin D-3) 25 MCG (1000 UT) capsule Take by mouth.      docusate sodium (Colace) 100 mg capsule Take 1 capsule (100 mg) by mouth 2 times a day. 180 capsule 1    lisinopril 10 mg tablet Take 1 tablet (10 mg) by mouth once daily. 90 tablet 3    [DISCONTINUED] famotidine (Pepcid) 40 mg tablet TAKE 1 TABLET BY MOUTH 2 TIMES A  tablet 1    [DISCONTINUED] meclizine (Antivert) 25 mg tablet Take 1 tablet (25 mg) by mouth 3 times a day as needed for dizziness. 30 tablet 2    [DISCONTINUED] sertraline (Zoloft) 25 mg tablet Take 1 tablet (25 mg) by mouth once daily. 90 tablet 1    [DISCONTINUED] traMADol (Ultram) 50 mg tablet Take 1 tablet (50 mg) by mouth every 8 hours if needed for severe pain (7 - 10). 90 tablet 1     No current facility-administered medications on file prior to visit.        Vitals:    03/12/25 1438   BP: 110/58   Pulse: 84   Temp: 36.3 °C (97.3 °F)   SpO2: 97%     Vitals:    03/12/25 1438   Weight: 55.6 kg (122 lb 9.3 oz)       Review of Systems   All other systems reviewed  and are negative.      Objective     Physical Exam  Constitutional:       Appearance: Normal appearance. She is well-developed.   HENT:      Head: Atraumatic.   Cardiovascular:      Rate and Rhythm: Normal rate and regular rhythm.      Heart sounds: Normal heart sounds. No murmur heard.  Pulmonary:      Effort: Pulmonary effort is normal.      Breath sounds: Normal breath sounds.   Abdominal:      General: Bowel sounds are normal.      Palpations: Abdomen is soft.   Skin:     General: Skin is warm.   Neurological:      General: No focal deficit present.      Mental Status: She is alert.   Psychiatric:         Mood and Affect: Mood normal.         No visits with results within 2 Month(s) from this visit.   Latest known visit with results is:   Lab on 12/09/2024   Component Date Value Ref Range Status    Glucose 12/09/2024 94  74 - 99 mg/dL Final    Sodium 12/09/2024 138  136 - 145 mmol/L Final    Potassium 12/09/2024 4.3  3.5 - 5.3 mmol/L Final    Chloride 12/09/2024 102  98 - 107 mmol/L Final    Bicarbonate 12/09/2024 29  21 - 32 mmol/L Final    Anion Gap 12/09/2024 11  10 - 20 mmol/L Final    Urea Nitrogen 12/09/2024 18  6 - 23 mg/dL Final    Creatinine 12/09/2024 1.18 (H)  0.50 - 1.05 mg/dL Final    eGFR 12/09/2024 47 (L)  >60 mL/min/1.73m*2 Final    Calculations of estimated GFR are performed using the 2021 CKD-EPI Study Refit equation without the race variable for the IDMS-Traceable creatinine methods.  https://jasn.asnjournals.org/content/early/2021/09/22/ASN.9352024974    Calcium 12/09/2024 9.6  8.6 - 10.3 mg/dL Final    Albumin 12/09/2024 4.2  3.4 - 5.0 g/dL Final    Alkaline Phosphatase 12/09/2024 57  33 - 136 U/L Final    Total Protein 12/09/2024 6.8  6.4 - 8.2 g/dL Final    AST 12/09/2024 10  9 - 39 U/L Final    Bilirubin, Total 12/09/2024 0.3  0.0 - 1.2 mg/dL Final    ALT 12/09/2024 6 (L)  7 - 45 U/L Final    Patients treated with Sulfasalazine may generate falsely decreased results for ALT.    WBC  12/09/2024 8.9  4.4 - 11.3 x10*3/uL Final    nRBC 12/09/2024 0.0  0.0 - 0.0 /100 WBCs Final    RBC 12/09/2024 5.02  4.00 - 5.20 x10*6/uL Final    Hemoglobin 12/09/2024 13.8  12.0 - 16.0 g/dL Final    Hematocrit 12/09/2024 43.6  36.0 - 46.0 % Final    MCV 12/09/2024 87  80 - 100 fL Final    MCH 12/09/2024 27.5  26.0 - 34.0 pg Final    MCHC 12/09/2024 31.7 (L)  32.0 - 36.0 g/dL Final    RDW 12/09/2024 13.0  11.5 - 14.5 % Final    Platelets 12/09/2024 348  150 - 450 x10*3/uL Final    Neutrophils % 12/09/2024 73.0  40.0 - 80.0 % Final    Immature Granulocytes %, Automated 12/09/2024 0.2  0.0 - 0.9 % Final    Immature Granulocyte Count (IG) includes promyelocytes, myelocytes and metamyelocytes but does not include bands. Percent differential counts (%) should be interpreted in the context of the absolute cell counts (cells/UL).    Lymphocytes % 12/09/2024 20.2  13.0 - 44.0 % Final    Monocytes % 12/09/2024 5.3  2.0 - 10.0 % Final    Eosinophils % 12/09/2024 0.8  0.0 - 6.0 % Final    Basophils % 12/09/2024 0.5  0.0 - 2.0 % Final    Neutrophils Absolute 12/09/2024 6.46 (H)  1.60 - 5.50 x10*3/uL Final    Percent differential counts (%) should be interpreted in the context of the absolute cell counts (cells/uL).    Immature Granulocytes Absolute, Au* 12/09/2024 0.02  0.00 - 0.50 x10*3/uL Final    Lymphocytes Absolute 12/09/2024 1.79  0.80 - 3.00 x10*3/uL Final    Monocytes Absolute 12/09/2024 0.47  0.05 - 0.80 x10*3/uL Final    Eosinophils Absolute 12/09/2024 0.07  0.00 - 0.40 x10*3/uL Final    Basophils Absolute 12/09/2024 0.04  0.00 - 0.10 x10*3/uL Final       Assessment/Plan   Problem List Items Addressed This Visit             ICD-10-CM    Fibromyalgia M79.7    Relevant Medications    traMADol (Ultram) 50 mg tablet    Other Relevant Orders    Comprehensive metabolic panel    Lipid panel    Hemoglobin A1c    Gastroesophageal reflux disease with esophagitis K21.00    Relevant Medications    famotidine (Pepcid) 40 mg tablet     Other Relevant Orders    Comprehensive metabolic panel    Lipid panel    Hemoglobin A1c    Stage 3a chronic kidney disease (Multi) N18.31    Relevant Orders    Comprehensive metabolic panel    Lipid panel    Hemoglobin A1c     Other Visit Diagnoses         Codes    IFG (impaired fasting glucose)    -  Primary R73.01    Relevant Orders    Comprehensive metabolic panel    Lipid panel    Hemoglobin A1c    Primary osteoarthritis, unspecified site     M19.91    Relevant Medications    traMADol (Ultram) 50 mg tablet    Other Relevant Orders    Comprehensive metabolic panel    Lipid panel    Hemoglobin A1c    Vertigo     R42    Relevant Medications    meclizine (Antivert) 25 mg tablet    Other Relevant Orders    Comprehensive metabolic panel    Lipid panel    Hemoglobin A1c    Primary hypertension     I10    Relevant Orders    Comprehensive metabolic panel    Lipid panel    Hemoglobin A1c          Refilled pts meds.   I personally reviewed the OAR[place] report for this patient. I have considered the risks of abuse, dependence, addiction, and diversion. I believe that it is clinically appropriate for this patient to be prescribed this medication based on documented diagnosis.      Fu in 3 mo

## 2025-06-11 DIAGNOSIS — M19.91 PRIMARY OSTEOARTHRITIS, UNSPECIFIED SITE: ICD-10-CM

## 2025-06-11 DIAGNOSIS — M79.7 FIBROMYALGIA: ICD-10-CM

## 2025-06-12 DIAGNOSIS — I10 PRIMARY HYPERTENSION: ICD-10-CM

## 2025-06-12 DIAGNOSIS — K21.00 GASTROESOPHAGEAL REFLUX DISEASE WITH ESOPHAGITIS WITHOUT HEMORRHAGE: ICD-10-CM

## 2025-06-12 DIAGNOSIS — M79.7 FIBROMYALGIA: ICD-10-CM

## 2025-06-12 DIAGNOSIS — R42 VERTIGO: ICD-10-CM

## 2025-06-12 DIAGNOSIS — R73.01 IFG (IMPAIRED FASTING GLUCOSE): ICD-10-CM

## 2025-06-12 DIAGNOSIS — N18.31 STAGE 3A CHRONIC KIDNEY DISEASE (MULTI): ICD-10-CM

## 2025-06-12 DIAGNOSIS — M19.91 PRIMARY OSTEOARTHRITIS, UNSPECIFIED SITE: ICD-10-CM

## 2025-06-12 RX ORDER — TRAMADOL HYDROCHLORIDE 50 MG/1
50 TABLET, FILM COATED ORAL EVERY 8 HOURS PRN
Qty: 90 TABLET | Refills: 1 | Status: SHIPPED | OUTPATIENT
Start: 2025-06-12

## 2025-06-13 LAB
ALBUMIN SERPL-MCNC: 4.6 G/DL (ref 3.6–5.1)
ALP SERPL-CCNC: 61 U/L (ref 37–153)
ALT SERPL-CCNC: 9 U/L (ref 6–29)
ANION GAP SERPL CALCULATED.4IONS-SCNC: 7 MMOL/L (CALC) (ref 7–17)
AST SERPL-CCNC: 13 U/L (ref 10–35)
BILIRUB SERPL-MCNC: 0.3 MG/DL (ref 0.2–1.2)
BUN SERPL-MCNC: 17 MG/DL (ref 7–25)
CALCIUM SERPL-MCNC: 10.2 MG/DL (ref 8.6–10.4)
CHLORIDE SERPL-SCNC: 101 MMOL/L (ref 98–110)
CHOLEST SERPL-MCNC: 244 MG/DL
CHOLEST/HDLC SERPL: 4.7 (CALC)
CO2 SERPL-SCNC: 27 MMOL/L (ref 20–32)
CREAT SERPL-MCNC: 1.24 MG/DL (ref 0.6–0.95)
EGFRCR SERPLBLD CKD-EPI 2021: 44 ML/MIN/1.73M2
EST. AVERAGE GLUCOSE BLD GHB EST-MCNC: 123 MG/DL
EST. AVERAGE GLUCOSE BLD GHB EST-SCNC: 6.8 MMOL/L
GLUCOSE SERPL-MCNC: 134 MG/DL (ref 65–99)
HBA1C MFR BLD: 5.9 %
HDLC SERPL-MCNC: 52 MG/DL
LDLC SERPL CALC-MCNC: 150 MG/DL (CALC)
NONHDLC SERPL-MCNC: 192 MG/DL (CALC)
POTASSIUM SERPL-SCNC: 5 MMOL/L (ref 3.5–5.3)
PROT SERPL-MCNC: 7.1 G/DL (ref 6.1–8.1)
SODIUM SERPL-SCNC: 135 MMOL/L (ref 135–146)
TRIGL SERPL-MCNC: 245 MG/DL

## 2025-06-16 ENCOUNTER — APPOINTMENT (OUTPATIENT)
Dept: PRIMARY CARE | Facility: CLINIC | Age: 80
End: 2025-06-16
Payer: MEDICARE

## 2025-06-16 VITALS
SYSTOLIC BLOOD PRESSURE: 108 MMHG | WEIGHT: 127.6 LBS | OXYGEN SATURATION: 97 % | BODY MASS INDEX: 25.77 KG/M2 | TEMPERATURE: 97.7 F | HEART RATE: 80 BPM | DIASTOLIC BLOOD PRESSURE: 64 MMHG

## 2025-06-16 DIAGNOSIS — M79.7 FIBROMYALGIA: ICD-10-CM

## 2025-06-16 DIAGNOSIS — C50.912 MALIGNANT NEOPLASM OF LEFT FEMALE BREAST, UNSPECIFIED ESTROGEN RECEPTOR STATUS, UNSPECIFIED SITE OF BREAST: Primary | ICD-10-CM

## 2025-06-16 DIAGNOSIS — Z78.0 ASYMPTOMATIC MENOPAUSAL STATE: ICD-10-CM

## 2025-06-16 DIAGNOSIS — C50.911 BILATERAL MALIGNANT NEOPLASM OF BREAST IN FEMALE, ESTROGEN RECEPTOR POSITIVE, UNSPECIFIED SITE OF BREAST: ICD-10-CM

## 2025-06-16 DIAGNOSIS — Z17.0 BILATERAL MALIGNANT NEOPLASM OF BREAST IN FEMALE, ESTROGEN RECEPTOR POSITIVE, UNSPECIFIED SITE OF BREAST: ICD-10-CM

## 2025-06-16 DIAGNOSIS — R73.01 IFG (IMPAIRED FASTING GLUCOSE): ICD-10-CM

## 2025-06-16 DIAGNOSIS — C50.912 BILATERAL MALIGNANT NEOPLASM OF BREAST IN FEMALE, ESTROGEN RECEPTOR POSITIVE, UNSPECIFIED SITE OF BREAST: ICD-10-CM

## 2025-06-16 DIAGNOSIS — I10 PRIMARY HYPERTENSION: ICD-10-CM

## 2025-06-16 DIAGNOSIS — M19.91 PRIMARY OSTEOARTHRITIS, UNSPECIFIED SITE: ICD-10-CM

## 2025-06-16 DIAGNOSIS — Z13.820 OSTEOPOROSIS SCREENING: ICD-10-CM

## 2025-06-16 PROCEDURE — 3074F SYST BP LT 130 MM HG: CPT | Performed by: FAMILY MEDICINE

## 2025-06-16 PROCEDURE — 99397 PER PM REEVAL EST PAT 65+ YR: CPT | Performed by: FAMILY MEDICINE

## 2025-06-16 PROCEDURE — 1159F MED LIST DOCD IN RCRD: CPT | Performed by: FAMILY MEDICINE

## 2025-06-16 PROCEDURE — 1036F TOBACCO NON-USER: CPT | Performed by: FAMILY MEDICINE

## 2025-06-16 PROCEDURE — 99214 OFFICE O/P EST MOD 30 MIN: CPT | Performed by: FAMILY MEDICINE

## 2025-06-16 PROCEDURE — G0439 PPPS, SUBSEQ VISIT: HCPCS | Performed by: FAMILY MEDICINE

## 2025-06-16 PROCEDURE — 3078F DIAST BP <80 MM HG: CPT | Performed by: FAMILY MEDICINE

## 2025-06-16 PROCEDURE — 1170F FXNL STATUS ASSESSED: CPT | Performed by: FAMILY MEDICINE

## 2025-06-16 RX ORDER — TRAMADOL HYDROCHLORIDE 50 MG/1
50 TABLET, FILM COATED ORAL EVERY 8 HOURS PRN
Qty: 90 TABLET | Refills: 1 | Status: SHIPPED | OUTPATIENT
Start: 2025-07-16

## 2025-06-16 RX ORDER — LISINOPRIL 10 MG/1
10 TABLET ORAL DAILY
Qty: 90 TABLET | Refills: 3 | Status: SHIPPED | OUTPATIENT
Start: 2025-06-16

## 2025-06-16 ASSESSMENT — ACTIVITIES OF DAILY LIVING (ADL)
BATHING: INDEPENDENT
MANAGING_FINANCES: INDEPENDENT
DRESSING: INDEPENDENT
DOING_HOUSEWORK: INDEPENDENT
GROCERY_SHOPPING: INDEPENDENT
TAKING_MEDICATION: INDEPENDENT

## 2025-06-16 NOTE — PROGRESS NOTES
Subjective   Patient ID: Lynn Oneal is a 79 y.o. female who presents for Medicare Annual Wellness Visit Subsequent (AWE) and Prediabetes (Review bw).  HPI    HPI: Annual Wellness visit. The patient has not felt down over the past 6 weeks. No limitation of movement. Reports no falls. The home has functioning smoke alarms, handrails on the stairs and rugs in the hallway. The home does not have grab bars in the bathroom or poor lighting. Denies hearing change in the ears bilaterally. No diet restrictions.     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  Conservative management.  GERD: stable  Vertigo: taking meclizine prn  CRI: mildly decreased GFR.    Anxiety: mood is doing ok.    Preparing meals - independent  Using telephone - independent  Bladder - continent  Bowel - continent    Recent hospital stays - no hospitalizations    ADLs - able to dress, walk and bath independently  Instrumental ADL's - able to shop, maintain finances, managing medications, housekeeping independently    Depression: PHQ-2 - negative    Opioid use -   none  Exercise -  mild  Diet - well balanced  Pain score - none    Providers  none    MiniCOG - 5/5    Education - educated pt on healthy eating, exercise    Falls - no falls    Counseled pt on living will: encouraged advanced directive    CV screening: negative    Colonoscopy: last was negative    Diabetes screening: neg    Glaucoma screen: sees ophtho    CT chest tob screen: NA    Mammo: not eligible    DEXA: ordered    PAP/pelvis: NA    Vaccines:  fully vaccinated,     Patient Active Problem List   Diagnosis    Chronic kidney disease    Essential hypertension    Fibromyalgia    Impaired fasting glucose    Prediabetes    Gastroesophageal reflux disease with esophagitis    Vitamin D deficiency    Stage 3a chronic kidney disease (Multi)    Ductal carcinoma in situ (DCIS) of right breast     Malignant neoplasm of left female breast, unspecified estrogen receptor status, unspecified site of breast    Bilateral malignant neoplasm of breast in female, estrogen receptor positive, unspecified site of breast       Social Connections: Not on file       Current Outpatient Medications on File Prior to Visit   Medication Sig Dispense Refill    cholecalciferol (Vitamin D-3) 25 MCG (1000 UT) capsule Take by mouth.      docusate sodium (Colace) 100 mg capsule Take 1 capsule (100 mg) by mouth 2 times a day. 180 capsule 1    famotidine (Pepcid) 40 mg tablet Take 1 tablet (40 mg) by mouth 2 times a day. 180 tablet 1    meclizine (Antivert) 25 mg tablet Take 1 tablet (25 mg) by mouth 3 times a day as needed for dizziness. 30 tablet 2    [DISCONTINUED] lisinopril 10 mg tablet Take 1 tablet (10 mg) by mouth once daily. 90 tablet 3    [DISCONTINUED] traMADol (Ultram) 50 mg tablet Take 1 tablet (50 mg) by mouth every 8 hours if needed for severe pain (7 - 10). 90 tablet 1    [DISCONTINUED] traMADol (Ultram) 50 mg tablet Take 1 tablet (50 mg) by mouth every 8 hours if needed for severe pain (7 - 10). 90 tablet 1     No current facility-administered medications on file prior to visit.        Vitals:    06/16/25 1519   BP: 108/64   Pulse: 80   Temp: 36.5 °C (97.7 °F)   SpO2: 97%     Vitals:    06/16/25 1519   Weight: 57.9 kg (127 lb 9.6 oz)       Review of Systems   All other systems reviewed and are negative.      Objective     Physical Exam  Vitals reviewed.   Constitutional:       General: She is not in acute distress.     Appearance: Normal appearance. She is well-developed. She is not diaphoretic.   HENT:      Head: Normocephalic and atraumatic.      Right Ear: Tympanic membrane normal.      Left Ear: Tympanic membrane normal.      Nose: Nose normal.      Mouth/Throat:      Mouth: Mucous membranes are moist.   Eyes:      Pupils: Pupils are equal, round, and reactive to light.   Cardiovascular:      Rate and Rhythm: Normal  rate and regular rhythm.      Heart sounds: Normal heart sounds. No murmur heard.     No friction rub. No gallop.   Pulmonary:      Effort: Pulmonary effort is normal.      Breath sounds: Normal breath sounds. No rales.   Abdominal:      General: Bowel sounds are normal.      Palpations: Abdomen is soft. There is mass.      Tenderness: There is no abdominal tenderness.   Musculoskeletal:      Cervical back: Normal range of motion and neck supple.   Skin:     General: Skin is warm and dry.   Neurological:      Mental Status: She is alert.   Psychiatric:         Mood and Affect: Mood normal.         Orders Only on 06/12/2025   Component Date Value Ref Range Status    GLUCOSE 06/12/2025 134 (H)  65 - 99 mg/dL Final    Comment:               Fasting reference interval     For someone without known diabetes, a glucose  value >125 mg/dL indicates that they may have  diabetes and this should be confirmed with a  follow-up test.         UREA NITROGEN (BUN) 06/12/2025 17  7 - 25 mg/dL Final    CREATININE 06/12/2025 1.24 (H)  0.60 - 0.95 mg/dL Final    EGFR 06/12/2025 44 (L)  > OR = 60 mL/min/1.73m2 Final    SODIUM 06/12/2025 135  135 - 146 mmol/L Final    POTASSIUM 06/12/2025 5.0  3.5 - 5.3 mmol/L Final    CHLORIDE 06/12/2025 101  98 - 110 mmol/L Final    CARBON DIOXIDE 06/12/2025 27  20 - 32 mmol/L Final    ELECTROLYTE BALANCE 06/12/2025 7  7 - 17 mmol/L (calc) Final    CALCIUM 06/12/2025 10.2  8.6 - 10.4 mg/dL Final    PROTEIN, TOTAL 06/12/2025 7.1  6.1 - 8.1 g/dL Final    ALBUMIN 06/12/2025 4.6  3.6 - 5.1 g/dL Final    BILIRUBIN, TOTAL 06/12/2025 0.3  0.2 - 1.2 mg/dL Final    ALKALINE PHOSPHATASE 06/12/2025 61  37 - 153 U/L Final    AST 06/12/2025 13  10 - 35 U/L Final    ALT 06/12/2025 9  6 - 29 U/L Final    CHOLESTEROL, TOTAL 06/12/2025 244 (H)  <200 mg/dL Final    HDL CHOLESTEROL 06/12/2025 52  > OR = 50 mg/dL Final    TRIGLYCERIDES 06/12/2025 245 (H)  <150 mg/dL Final    Comment:    If a non-fasting specimen was  collected, consider  repeat triglyceride testing on a fasting specimen  if clinically indicated.   Chela et al. J. of Clin. Lipidol. 2015;9:129-169.         LDL-CHOLESTEROL 06/12/2025 150 (H)  mg/dL (calc) Final    Comment: Reference range: <100     Desirable range <100 mg/dL for primary prevention;    <70 mg/dL for patients with CHD or diabetic patients   with > or = 2 CHD risk factors.     LDL-C is now calculated using the Khris-Ruiz   calculation, which is a validated novel method providing   better accuracy than the Friedewald equation in the   estimation of LDL-C.   Khris ARORA et al. ROB. 2013;310(19): 3956-5004   (http://education.[employer].[place]/faq/RHG174)      CHOL/HDLC RATIO 06/12/2025 4.7  <5.0 (calc) Final    NON HDL CHOLESTEROL 06/12/2025 192 (H)  <130 mg/dL (calc) Final    Comment: For patients with diabetes plus 1 major ASCVD risk   factor, treating to a non-HDL-C goal of <100 mg/dL   (LDL-C of <70 mg/dL) is considered a therapeutic   option.      HEMOGLOBIN A1c 06/12/2025 5.9 (H)  <5.7 % Final    Comment: For someone without known diabetes, a hemoglobin   A1c value between 5.7% and 6.4% is consistent with  prediabetes and should be confirmed with a   follow-up test.     For someone with known diabetes, a value <7%  indicates that their diabetes is well controlled. A1c  targets should be individualized based on duration of  diabetes, age, comorbid conditions, and other  considerations.     This assay result is consistent with an increased risk  of diabetes.     Currently, no consensus exists regarding use of  hemoglobin A1c for diagnosis of diabetes for children.         eAG (mg/dL) 06/12/2025 123  mg/dL Final    eAG (mmol/L) 06/12/2025 6.8  mmol/L Final       Assessment/Plan   Problem List Items Addressed This Visit       Fibromyalgia    Relevant Medications    traMADol (Ultram) 50 mg tablet (Start on 7/16/2025)    Other Relevant Orders    Comprehensive metabolic panel    Lipid panel     Hemoglobin A1c    Malignant neoplasm of left female breast, unspecified estrogen receptor status, unspecified site of breast - Primary    Relevant Orders    Comprehensive metabolic panel    Lipid panel    Hemoglobin A1c    Bilateral malignant neoplasm of breast in female, estrogen receptor positive, unspecified site of breast    Relevant Orders    Comprehensive metabolic panel    Lipid panel    Hemoglobin A1c     Other Visit Diagnoses         Primary hypertension        Relevant Medications    lisinopril 10 mg tablet    Other Relevant Orders    Comprehensive metabolic panel    Lipid panel    Hemoglobin A1c      Primary osteoarthritis, unspecified site        Relevant Medications    traMADol (Ultram) 50 mg tablet (Start on 7/16/2025)    Other Relevant Orders    Comprehensive metabolic panel    Lipid panel    Hemoglobin A1c      IFG (impaired fasting glucose)        Relevant Orders    Hemoglobin A1c      Osteoporosis screening        Relevant Orders    XR DEXA bone density      Asymptomatic menopausal state        Relevant Orders    XR DEXA bone density          Doing well.  Refilled pts meds.  Fu in 3 mo.  Labs look stable.    I personally reviewed the OARRS report for this patient. I have considered the risks of abuse, dependence, addiction, and diversion. I believe that it is clinically appropriate for this patient to be prescribed this medication based on documented diagnosis.

## 2025-09-16 ENCOUNTER — APPOINTMENT (OUTPATIENT)
Dept: PRIMARY CARE | Facility: CLINIC | Age: 80
End: 2025-09-16
Payer: MEDICARE

## (undated) DEVICE — Device

## (undated) DEVICE — PREP TRAY, SKIN, DRY, W/GLOVES

## (undated) DEVICE — COVER HANDLE LIGHT, STERIS, BLUE, STERILE

## (undated) DEVICE — APPLIER, LIGACLIP, MULTIPLE, SMALL 9-3/8IN

## (undated) DEVICE — PREP, SCRUB, SKIN, FOAM, HIBICLENS, 4 OZ

## (undated) DEVICE — SUTURE, VICRYL, 3-0, 27 IN, SH

## (undated) DEVICE — TOWEL PACK, STERILE, 4/PACK, BLUE

## (undated) DEVICE — DRAPE, SHEET, ENDOSCOPY, GENERAL, FENESTRATED, ARMBOARD COVER, 98 X 123.5 IN, DISPOSABLE, LF, STERILE

## (undated) DEVICE — EVACUATOR, WOUND, SUCTION, CLOSED, JACKSON-PRATT, 100 CC, SILICONE

## (undated) DEVICE — DRESSING, GAUZE, SPONGE, 12 PLY, 4 X 4 IN, PLASTIC POUCH, STRL 10PK

## (undated) DEVICE — SUTURE, VICRYL, 4-0, 18 IN, UNDYED BR PS-2

## (undated) DEVICE — SUTURE, SILK, 3-0, 18 IN, MULTIPACK, BLACK

## (undated) DEVICE — SUTURE, SILK, 2-0, 18 IN, BLACK

## (undated) DEVICE — SUTURE, SILK, 4-0, 18 IN, LABYRINTH, BLACK

## (undated) DEVICE — APPLIER, MULTIPLE CLIP, LIGACLIP, W/20 MEDIUM, 11.5 APPLIER